# Patient Record
Sex: FEMALE | Race: BLACK OR AFRICAN AMERICAN | NOT HISPANIC OR LATINO | Employment: FULL TIME | URBAN - METROPOLITAN AREA
[De-identification: names, ages, dates, MRNs, and addresses within clinical notes are randomized per-mention and may not be internally consistent; named-entity substitution may affect disease eponyms.]

---

## 2017-11-07 ENCOUNTER — OFFICE VISIT (OUTPATIENT)
Dept: FAMILY MEDICINE | Facility: CLINIC | Age: 32
End: 2017-11-07
Payer: COMMERCIAL

## 2017-11-07 VITALS
TEMPERATURE: 99 F | HEIGHT: 65 IN | WEIGHT: 152.19 LBS | OXYGEN SATURATION: 100 % | HEART RATE: 67 BPM | DIASTOLIC BLOOD PRESSURE: 68 MMHG | BODY MASS INDEX: 25.36 KG/M2 | SYSTOLIC BLOOD PRESSURE: 102 MMHG

## 2017-11-07 DIAGNOSIS — E86.0 DEHYDRATION: ICD-10-CM

## 2017-11-07 DIAGNOSIS — Z00.00 ROUTINE HEALTH MAINTENANCE: Primary | ICD-10-CM

## 2017-11-07 PROCEDURE — 99385 PREV VISIT NEW AGE 18-39: CPT | Mod: S$GLB,,, | Performed by: FAMILY MEDICINE

## 2017-11-07 NOTE — PROGRESS NOTES
Patient ID: Mary Carmen Ann is a 31 y.o. female.    Chief Complaint: Establish Care; Polydipsia; Flank Pain; and Hip Injury    HPI      Mary Carmen Ann is a 31 y.o. female.  Here Establishing physician.  She has experienced episodes of increase thirst, acute lethargy some sweating.  Not really associated with meals or other activity.  No chest pain palpitation.  No dyspnea on exertion or PND.  Works with [employer] on [employer]  States that she feels dehydrated-increase his water intake and it helps a little.      Review of Symptoms    Constitutional: Negative.    HENT: Negative.    Eyes: Negative.    Respiratory: Negative.    Cardiovascular: Negative.    Gastrointestinal: Negative.    Endocrine: Negative.    Genitourinary: Negative.    Musculoskeletal: Negative.    Skin: Negative.    Allergic/Immunologic: Negative.    Neurological: Negative.    Hematological: Negative.    Psychiatric/Behavioral: Negative.      Except as above in HPI        Physical  Exam    Constitutional:  Oriented to person, place, and time. Appears well-developed and well-nourished.     HENT:   Head: Normocephalic and atraumatic.     Right Ear: Tympanic membrane, external ear and ear canal normal.     Left Ear: Tympanic membrane, external ear and ear canal normal.     Nose: Nose normal. No rhinorrhea or nasal deformity.     Mouth/Throat: Uvula is midline, oropharynx is clear and moist and mucous membranes are normal.      Eyes: Conjunctivae are normal. Right eye exhibits no discharge. Left eye exhibits no discharge. No scleral icterus.     Neck:  No JVD present. No tracheal deviation  [x]  Neck supple.   []  No Carotid bruit    Cardiovascular: Normal rate, regular rhythm and normal heart sounds.      Pulmonary/Chest: Effort normal and breath sounds normal. No stridor. No respiratory distress. No wheezes. No rales.      Musculoskeletal: Normal range of motion. No edema or tenderness.   No deformity     Lymphadenopathy:  No cervical adenopathy.      Neurological:  Alert and oriented to person, place, and time. Coordination normal.     Skin: Skin is warm and dry. No rash noted.     Psychiatric: Normal mood and affect. Speech is normal and behavior is normal. Judgment and thought content normal.     Complete Blood Count  Lab Results   Component Value Date    RBC 4.21 11/09/2017    HGB 13.3 11/09/2017    HCT 40.9 11/09/2017    MCV 97.1 11/09/2017    MCH 31.6 11/09/2017    MCHC 32.5 11/09/2017    RDW 12.4 11/09/2017     11/09/2017    MPV 11.9 11/09/2017    GRAN 4.0 10/10/2011    GRAN 62.8 10/10/2011    LYMPH 2,465 11/09/2017    LYMPH 31.6 11/09/2017    MONO 554 11/09/2017    MONO 7.1 11/09/2017    EOS 31 11/09/2017    BASO 23 11/09/2017    EOSINOPHIL 0.4 11/09/2017    BASOPHIL 0.3 11/09/2017       Comprehensive Metabolic Panel  Lab Results   Component Value Date     (H) 11/09/2017    BUN 12 11/09/2017    CREATININE 0.97 11/09/2017     11/09/2017    K 4.2 11/09/2017     11/09/2017    PROT 7.3 11/09/2017    ALBUMIN 4.6 11/09/2017    BILITOT 0.4 11/09/2017    AST 12 11/09/2017    ALKPHOS 67 11/09/2017    CO2 27 11/09/2017    ALT 8 11/09/2017    EGFRNONAA 79 11/09/2017    ESTGFRAFRICA 91 11/09/2017       TSH  Lab Results   Component Value Date    TSH 1.88 11/09/2017       Assessment / Plan:      ICD-10-CM ICD-9-CM   1. Routine health maintenance Z00.00 V70.0   2. Dehydration E86.0 276.51     Routine health maintenance  -     Comprehensive metabolic panel; Future  -     CBC auto differential; Future  -     Lipid panel; Future  -     TSH; Future  -     Urinalysis; Future; Expected date: 11/07/2017    Dehydration  -     Comprehensive metabolic panel; Future  -     CBC auto differential; Future  -     Lipid panel; Future  -     TSH; Future  -     Urinalysis; Future; Expected date: 11/07/2017       unsure of etiology-suggested doing lab work and letting this antolin treatment neededDiscussed how to stay healthy including: diet, exercise, refraining  from smoking and discussed screening exams / tests needed for age, sex and family Hx.

## 2017-11-10 LAB
ALBUMIN SERPL-MCNC: 4.6 G/DL (ref 3.6–5.1)
ALBUMIN/GLOB SERPL: 1.7 (CALC) (ref 1–2.5)
ALP SERPL-CCNC: 67 U/L (ref 33–115)
ALT SERPL-CCNC: 8 U/L (ref 6–29)
APPEARANCE UR: CLEAR
AST SERPL-CCNC: 12 U/L (ref 10–30)
BASOPHILS # BLD AUTO: 23 CELLS/UL (ref 0–200)
BASOPHILS NFR BLD AUTO: 0.3 %
BILIRUB SERPL-MCNC: 0.4 MG/DL (ref 0.2–1.2)
BILIRUB UR QL STRIP: NEGATIVE
BUN SERPL-MCNC: 12 MG/DL (ref 7–25)
BUN/CREAT SERPL: ABNORMAL (CALC) (ref 6–22)
CALCIUM SERPL-MCNC: 9.6 MG/DL (ref 8.6–10.2)
CHLORIDE SERPL-SCNC: 107 MMOL/L (ref 98–110)
CHOLEST SERPL-MCNC: 160 MG/DL
CHOLEST/HDLC SERPL: 2.6 (CALC)
CO2 SERPL-SCNC: 27 MMOL/L (ref 20–31)
COLOR UR: YELLOW
CREAT SERPL-MCNC: 0.97 MG/DL (ref 0.5–1.1)
EOSINOPHIL # BLD AUTO: 31 CELLS/UL (ref 15–500)
EOSINOPHIL NFR BLD AUTO: 0.4 %
ERYTHROCYTE [DISTWIDTH] IN BLOOD BY AUTOMATED COUNT: 12.4 % (ref 11–15)
GFR SERPL CREATININE-BSD FRML MDRD: 79 ML/MIN/1.73M2
GLOBULIN SER CALC-MCNC: 2.7 G/DL (CALC) (ref 1.9–3.7)
GLUCOSE SERPL-MCNC: 100 MG/DL (ref 65–99)
GLUCOSE UR QL STRIP: NEGATIVE
HCT VFR BLD AUTO: 40.9 % (ref 35–45)
HDLC SERPL-MCNC: 61 MG/DL
HGB BLD-MCNC: 13.3 G/DL (ref 11.7–15.5)
HGB UR QL STRIP: NEGATIVE
KETONES UR QL STRIP: NEGATIVE
LDLC SERPL CALC-MCNC: 87 MG/DL (CALC)
LEUKOCYTE ESTERASE UR QL STRIP: NEGATIVE
LYMPHOCYTES # BLD AUTO: 2465 CELLS/UL (ref 850–3900)
LYMPHOCYTES NFR BLD AUTO: 31.6 %
MCH RBC QN AUTO: 31.6 PG (ref 27–33)
MCHC RBC AUTO-ENTMCNC: 32.5 G/DL (ref 32–36)
MCV RBC AUTO: 97.1 FL (ref 80–100)
MONOCYTES # BLD AUTO: 554 CELLS/UL (ref 200–950)
MONOCYTES NFR BLD AUTO: 7.1 %
NEUTROPHILS # BLD AUTO: 4727 CELLS/UL (ref 1500–7800)
NEUTROPHILS NFR BLD AUTO: 60.6 %
NITRITE UR QL STRIP: NEGATIVE
NONHDLC SERPL-MCNC: 99 MG/DL (CALC)
PH UR STRIP: 7 [PH] (ref 5–8)
PLATELET # BLD AUTO: 265 THOUSAND/UL (ref 140–400)
PMV BLD REES-ECKER: 11.9 FL (ref 7.5–12.5)
POTASSIUM SERPL-SCNC: 4.2 MMOL/L (ref 3.5–5.3)
PROT SERPL-MCNC: 7.3 G/DL (ref 6.1–8.1)
PROT UR QL STRIP: NEGATIVE
RBC # BLD AUTO: 4.21 MILLION/UL (ref 3.8–5.1)
SODIUM SERPL-SCNC: 141 MMOL/L (ref 135–146)
SP GR UR STRIP: 1.01 (ref 1–1.03)
TRIGL SERPL-MCNC: 50 MG/DL
TSH SERPL-ACNC: 1.88 MIU/L
WBC # BLD AUTO: 7.8 THOUSAND/UL (ref 3.8–10.8)

## 2018-12-13 ENCOUNTER — TELEPHONE (OUTPATIENT)
Dept: FAMILY MEDICINE | Facility: CLINIC | Age: 33
End: 2018-12-13

## 2018-12-13 ENCOUNTER — OFFICE VISIT (OUTPATIENT)
Dept: FAMILY MEDICINE | Facility: CLINIC | Age: 33
End: 2018-12-13
Payer: COMMERCIAL

## 2018-12-13 VITALS
HEART RATE: 71 BPM | TEMPERATURE: 98 F | SYSTOLIC BLOOD PRESSURE: 94 MMHG | BODY MASS INDEX: 25.22 KG/M2 | OXYGEN SATURATION: 100 % | DIASTOLIC BLOOD PRESSURE: 60 MMHG | HEIGHT: 65 IN | WEIGHT: 151.38 LBS

## 2018-12-13 DIAGNOSIS — R20.0 LEFT LEG NUMBNESS: Primary | ICD-10-CM

## 2018-12-13 PROCEDURE — 99213 OFFICE O/P EST LOW 20 MIN: CPT | Mod: S$GLB,,, | Performed by: FAMILY MEDICINE

## 2018-12-13 PROCEDURE — 3008F BODY MASS INDEX DOCD: CPT | Mod: CPTII,S$GLB,, | Performed by: FAMILY MEDICINE

## 2018-12-13 RX ORDER — NAPROXEN 375 MG/1
TABLET ORAL
COMMUNITY
Start: 2018-12-10 | End: 2019-06-20

## 2018-12-13 NOTE — TELEPHONE ENCOUNTER
I have scheduled her today at 4 with dr frost  And I have notified the pt  Dr carbajal is unable to see her today

## 2018-12-13 NOTE — TELEPHONE ENCOUNTER
----- Message from Susanna Loco sent at 12/13/2018 10:37 AM CST -----  Contact: [phone]/self  Patient is requesting to be seen today. She has numbness is left thigh over the past 12 hours. Please call to schedule. Thanks

## 2018-12-13 NOTE — PROGRESS NOTES
Chief Complaint  Chief Complaint   Patient presents with    Numbness     lt leg thigh        HPI  Mary Carmen Ann is a 33 y.o. female with multiple medical diagnoses as listed in the medical history and problem list that presents for numbness in her left thigh.  Numbness started yesterday evening.  No fevers, no injury, no redness or swelling or rash.  Patient has a history of bilateral hip fractures and was recently prescribed naprosyn for pain in her right thigh.  She occasionally has back pain that she treats at home.  No weakness in the leg and No incontinenc.  She describes the numbness as the feeling when your arm falls asleep.       PAST MEDICAL HISTORY:  History reviewed. No pertinent past medical history.    PAST SURGICAL HISTORY:  Past Surgical History:   Procedure Laterality Date    KNEE SURGERY         SOCIAL HISTORY:  Social History     Socioeconomic History    Marital status: Single     Spouse name: Not on file    Number of children: Not on file    Years of education: Not on file    Highest education level: Not on file   Social Needs    Financial resource strain: Not on file    Food insecurity - worry: Not on file    Food insecurity - inability: Not on file    Transportation needs - medical: Not on file    Transportation needs - non-medical: Not on file   Occupational History    Not on file   Tobacco Use    Smoking status: Never Smoker    Smokeless tobacco: Never Used   Substance and Sexual Activity    Alcohol use: Yes     Comment: occassionally     Drug use: No    Sexual activity: Not on file   Other Topics Concern    Not on file   Social History Narrative    Not on file       FAMILY HISTORY:  Family History   Problem Relation Age of Onset    Diabetes Mother     Hypertension Mother     Cancer Father        ALLERGIES AND MEDICATIONS: updated and reviewed.  Review of patient's allergies indicates:  No Known Allergies  Current Outpatient Medications   Medication Sig Dispense Refill  "   naproxen (NAPROSYN) 375 MG tablet        No current facility-administered medications for this visit.          ROS  Review of Systems   Constitutional: Negative for activity change, appetite change, chills and fatigue.   HENT: Negative for congestion, ear discharge, ear pain, rhinorrhea, sinus pain, sore throat and trouble swallowing.    Eyes: Negative for photophobia, pain, redness, itching and visual disturbance.   Respiratory: Negative for cough, chest tightness, shortness of breath and wheezing.    Cardiovascular: Negative for chest pain, palpitations and leg swelling.   Gastrointestinal: Negative for abdominal distention, abdominal pain, blood in stool, diarrhea, nausea and vomiting.   Genitourinary: Negative for dysuria, pelvic pain, vaginal bleeding, vaginal discharge and vaginal pain.   Musculoskeletal: Negative for arthralgias, back pain, gait problem and neck pain.   Skin: Negative for color change, pallor and rash.   Neurological: Positive for numbness. Negative for dizziness, tremors, weakness, light-headedness and headaches.   Psychiatric/Behavioral: Negative for agitation, behavioral problems, confusion and sleep disturbance.           PHYSICAL EXAM  Vitals:    12/13/18 1602   BP: 94/60   BP Location: Right arm   Patient Position: Sitting   Pulse: 71   Temp: 98.4 °F (36.9 °C)   TempSrc: Oral   SpO2: 100%   Weight: 68.6 kg (151 lb 5.5 oz)   Height: 5' 5" (1.651 m)    Body mass index is 25.19 kg/m².  Weight: 68.6 kg (151 lb 5.5 oz)   Height: 5' 5" (165.1 cm)     Physical Exam   Constitutional: She appears well-developed and well-nourished.   HENT:   Head: Normocephalic.   Eyes: Conjunctivae are normal.   Neck: Normal range of motion. Neck supple.   Cardiovascular: Normal rate, regular rhythm and normal heart sounds.   Pulmonary/Chest: Effort normal and breath sounds normal.   Musculoskeletal:        Left hip: She exhibits tenderness and deformity. She exhibits normal range of motion, normal strength, " no bony tenderness, no swelling and no crepitus.        Lumbar back: She exhibits normal range of motion, no tenderness, no bony tenderness, no swelling, no deformity, no pain and no spasm.   Maybe slight discomfort ov er the left trochanteric bursa   Neurological: She is alert. She has normal strength. No sensory deficit.   Sensation to sharp and soft is intact over the left left thigh although patient describes a change in the sharp sensation.    Skin: Skin is warm and dry.   Psychiatric: Her behavior is normal.         Health Maintenance       Date Due Completion Date    TETANUS VACCINE 11/19/2005 ---    Pap Smear with HPV Cotest 11/19/2008 ---    Influenza Vaccine 08/01/2018 2/2/2010            Assessment & Plan      Mary Carmen was seen today for numbness.    Diagnoses and all orders for this visit:    Left leg numbness       -I don't see any obvious cause for the numbness sensation she is having.  I recommended some stretches for the piriformin and gluteus muscles and that she get started on the naprosyn to reduce any inflammation in the area.     Follow-up: No Follow-up on file.

## 2019-06-20 ENCOUNTER — OFFICE VISIT (OUTPATIENT)
Dept: OBSTETRICS AND GYNECOLOGY | Facility: CLINIC | Age: 34
End: 2019-06-20
Payer: COMMERCIAL

## 2019-06-20 VITALS
DIASTOLIC BLOOD PRESSURE: 66 MMHG | HEIGHT: 65 IN | WEIGHT: 152.75 LBS | SYSTOLIC BLOOD PRESSURE: 114 MMHG | BODY MASS INDEX: 25.45 KG/M2

## 2019-06-20 DIAGNOSIS — Z01.419 ENCOUNTER FOR ANNUAL ROUTINE GYNECOLOGICAL EXAMINATION: Primary | ICD-10-CM

## 2019-06-20 DIAGNOSIS — Z11.3 SCREENING EXAMINATION FOR STD (SEXUALLY TRANSMITTED DISEASE): ICD-10-CM

## 2019-06-20 DIAGNOSIS — Z12.4 PAP SMEAR FOR CERVICAL CANCER SCREENING: ICD-10-CM

## 2019-06-20 PROCEDURE — 87624 HPV HI-RISK TYP POOLED RSLT: CPT

## 2019-06-20 PROCEDURE — 87491 CHLMYD TRACH DNA AMP PROBE: CPT

## 2019-06-20 PROCEDURE — 88175 CYTOPATH C/V AUTO FLUID REDO: CPT

## 2019-06-20 PROCEDURE — 87510 GARDNER VAG DNA DIR PROBE: CPT

## 2019-06-20 PROCEDURE — 99999 PR PBB SHADOW E&M-EST. PATIENT-LVL III: CPT | Mod: PBBFAC,,, | Performed by: OBSTETRICS & GYNECOLOGY

## 2019-06-20 PROCEDURE — 99999 PR PBB SHADOW E&M-EST. PATIENT-LVL III: ICD-10-PCS | Mod: PBBFAC,,, | Performed by: OBSTETRICS & GYNECOLOGY

## 2019-06-20 PROCEDURE — 99385 PR PREVENTIVE VISIT,NEW,18-39: ICD-10-PCS | Mod: S$GLB,,, | Performed by: OBSTETRICS & GYNECOLOGY

## 2019-06-20 PROCEDURE — 99385 PREV VISIT NEW AGE 18-39: CPT | Mod: S$GLB,,, | Performed by: OBSTETRICS & GYNECOLOGY

## 2019-06-20 PROCEDURE — 87480 CANDIDA DNA DIR PROBE: CPT

## 2019-06-20 NOTE — PROGRESS NOTES
Chief Complaint   Patient presents with    Annual Exam       HISTORY OF PRESENT ILLNESS:   Mary Carmen Ann is a 33 y.o. female  who presents for well woman exam.  Patient's last menstrual period was 2019..  She has no complaints.  Cycles are regular. Desires STD testing but declines blood work. Declines birth control.   History reviewed. No pertinent past medical history.       Past Surgical History:   Procedure Laterality Date    KNEE SURGERY           Social History     Socioeconomic History    Marital status: Single     Spouse name: Not on file    Number of children: Not on file    Years of education: Not on file    Highest education level: Not on file   Occupational History    Not on file   Social Needs    Financial resource strain: Not on file    Food insecurity:     Worry: Not on file     Inability: Not on file    Transportation needs:     Medical: Not on file     Non-medical: Not on file   Tobacco Use    Smoking status: Never Smoker    Smokeless tobacco: Never Used   Substance and Sexual Activity    Alcohol use: Yes     Comment: occassionally     Drug use: No    Sexual activity: Yes     Partners: Female     Birth control/protection: None   Lifestyle    Physical activity:     Days per week: Not on file     Minutes per session: Not on file    Stress: Not on file   Relationships    Social connections:     Talks on phone: Not on file     Gets together: Not on file     Attends Latter-day service: Not on file     Active member of club or organization: Not on file     Attends meetings of clubs or organizations: Not on file     Relationship status: Not on file   Other Topics Concern    Not on file   Social History Narrative    Not on file       Family History   Problem Relation Age of Onset    Diabetes Mother     Hypertension Mother     Cancer Father          OB History    Para Term  AB Living   0 0 0 0 0 0   SAB TAB Ectopic Multiple Live Births   0 0 0 0 0  "        COMPREHENSIVE GYN HISTORY:  PAP History: Denies abnormal Paps  Infection History: Denies STDs. Denies PID.  Benign History:Denies uterine fibroids. Denies ovarian cysts. Denies endometriosis Denies other conditions.  Cancer History: Denies cervical cancer. Denies uterine cancer or hyperplasia. Denies ovarian cancer. Denies vulvar cancer or pre-cancer. Denies vaginal cancer or pre-cancer. Denies breast cancer. Denies colon cancer.  Cycle: nl age/mon/7d, very painful on first 2 days but controlled with ibuprofen     ROS:  GENERAL: Denies weight gain or weight loss. Feeling well overall.   SKIN: Denies rash or lesions.   HEAD: Denies headache.   NODES: Denies enlarged lymph nodes.   CHEST: Denies shortness of breath.   ABDOMEN: No abdominal pain, constipation, diarrhea, nausea, vomiting or rectal bleeding.   URINARY: No frequency, dysuria, hematuria, or burning on urination.  REPRODUCTIVE: See HPI.   BREASTS: The patient denies pain, lumps, or nipple discharge.       /66   Ht 5' 5" (1.651 m)   Wt 69.3 kg (152 lb 12.5 oz)   LMP 06/02/2019   BMI 25.42 kg/m²     APPEARANCE: Well nourished, well developed, in no acute distress.  NECK: Neck symmetric without  thyromegaly.  NODES: No inguinal, cervical lymph node enlargement.  CHEST: Lungs clear to auscultation.  HEART: Regular rate and rhythm, no murmurs, rubs or gallops.  ABDOMEN: Soft. No tenderness or masses. No hernias. No hepatosplenomegaly.  BREASTS: Symmetrical, no skin changes or visible lesions. No palpable masses, nipple discharge or adenopathy bilaterally.  PELVIC:   VULVA: No lesions. Normal female genitalia.  URETHRAL MEATUS: Normal size and location, no lesions, no prolapse.  URETHRA: No masses, tenderness, prolapse or scarring.  VAGINA: Moist and well rugated, no discharge, no significant cystocele or rectocele.  CERVIX: No lesions and discharge.  UTERUS: Normal size, regular shape, mobile, non-tender, bladder base nontender.  ADNEXA: No " masses or tenderness.        1. Encounter for annual routine gynecological examination    2. Pap smear for cervical cancer screening    3. Screening examination for STD (sexually transmitted disease)        Plan:  Routine gyn s/p normal breast exam. Pap With HPV cotesting ordered . STD testing: GC/CT/trich ordered but syphilis, HBV/HCV and HIV declined. Counseled on contraception and declines      F/u in 1 yr or PRN

## 2019-06-22 LAB
BACTERIAL VAGINOSIS DNA: NEGATIVE
CANDIDA GLABRATA DNA: NEGATIVE
CANDIDA KRUSEI DNA: NEGATIVE
CANDIDA RRNA VAG QL PROBE: POSITIVE
T VAGINALIS RRNA GENITAL QL PROBE: NEGATIVE

## 2019-06-23 LAB
C TRACH DNA SPEC QL NAA+PROBE: NOT DETECTED
N GONORRHOEA DNA SPEC QL NAA+PROBE: NOT DETECTED

## 2019-06-24 ENCOUNTER — PATIENT MESSAGE (OUTPATIENT)
Dept: OBSTETRICS AND GYNECOLOGY | Facility: HOSPITAL | Age: 34
End: 2019-06-24

## 2019-06-24 RX ORDER — FLUCONAZOLE 150 MG/1
150 TABLET ORAL
Qty: 2 TABLET | Refills: 0 | Status: SHIPPED | OUTPATIENT
Start: 2019-06-24 | End: 2019-06-28

## 2019-06-27 LAB
HPV HR 12 DNA CVX QL NAA+PROBE: NEGATIVE
HPV16 AG SPEC QL: NEGATIVE
HPV18 DNA SPEC QL NAA+PROBE: NEGATIVE

## 2019-06-28 ENCOUNTER — PATIENT MESSAGE (OUTPATIENT)
Dept: OBSTETRICS AND GYNECOLOGY | Facility: CLINIC | Age: 34
End: 2019-06-28

## 2019-08-03 ENCOUNTER — OFFICE VISIT (OUTPATIENT)
Dept: FAMILY MEDICINE | Facility: CLINIC | Age: 34
End: 2019-08-03
Payer: COMMERCIAL

## 2019-08-03 VITALS
TEMPERATURE: 98 F | BODY MASS INDEX: 26.54 KG/M2 | SYSTOLIC BLOOD PRESSURE: 94 MMHG | OXYGEN SATURATION: 98 % | HEIGHT: 65 IN | WEIGHT: 159.31 LBS | HEART RATE: 68 BPM | DIASTOLIC BLOOD PRESSURE: 70 MMHG

## 2019-08-03 DIAGNOSIS — Z23 NEED FOR TETANUS BOOSTER: Primary | ICD-10-CM

## 2019-08-03 DIAGNOSIS — M79.672 LEFT FOOT PAIN: ICD-10-CM

## 2019-08-03 DIAGNOSIS — N94.6 DYSMENORRHEA: ICD-10-CM

## 2019-08-03 PROCEDURE — 90471 IMMUNIZATION ADMIN: CPT | Mod: S$GLB,,, | Performed by: FAMILY MEDICINE

## 2019-08-03 PROCEDURE — 99214 OFFICE O/P EST MOD 30 MIN: CPT | Mod: 25,S$GLB,, | Performed by: FAMILY MEDICINE

## 2019-08-03 PROCEDURE — 3008F PR BODY MASS INDEX (BMI) DOCUMENTED: ICD-10-PCS | Mod: CPTII,S$GLB,, | Performed by: FAMILY MEDICINE

## 2019-08-03 PROCEDURE — 90715 TDAP VACCINE 7 YRS/> IM: CPT | Mod: S$GLB,,, | Performed by: FAMILY MEDICINE

## 2019-08-03 PROCEDURE — 99214 PR OFFICE/OUTPT VISIT, EST, LEVL IV, 30-39 MIN: ICD-10-PCS | Mod: 25,S$GLB,, | Performed by: FAMILY MEDICINE

## 2019-08-03 PROCEDURE — 90471 TDAP VACCINE GREATER THAN OR EQUAL TO 7YO IM: ICD-10-PCS | Mod: S$GLB,,, | Performed by: FAMILY MEDICINE

## 2019-08-03 PROCEDURE — 90715 TDAP VACCINE GREATER THAN OR EQUAL TO 7YO IM: ICD-10-PCS | Mod: S$GLB,,, | Performed by: FAMILY MEDICINE

## 2019-08-03 PROCEDURE — 3008F BODY MASS INDEX DOCD: CPT | Mod: CPTII,S$GLB,, | Performed by: FAMILY MEDICINE

## 2019-08-03 RX ORDER — NAPROXEN 500 MG/1
500 TABLET ORAL 2 TIMES DAILY
Qty: 20 TABLET | Refills: 0 | Status: SHIPPED | OUTPATIENT
Start: 2019-08-03

## 2019-08-03 NOTE — PROGRESS NOTES
Subjective:       Patient ID: Mary Carmen Ann is a 33 y.o. female.    Chief Complaint:   Chief Complaint   Patient presents with    Foot Pain       Foot Injury    Incident onset: about 4 months. There was no injury mechanism. Pain location: left 5th metatarsal  The pain is mild (Feels like it needs to pop, but when she does pop it it becomes painful.  Doing aerobic exercise daily. ). The pain has been fluctuating since onset. Pertinent negatives include no inability to bear weight, loss of motion, loss of sensation, muscle weakness, numbness or tingling. She reports no foreign bodies present. The symptoms are aggravated by weight bearing. She has tried nothing for the symptoms.   Female  Problem   Primary symptoms comment: pain with periods. This is a chronic problem. The current episode started more than 1 year ago. The problem occurs intermittently. The problem has been unchanged. She is not pregnant. Associated symptoms include abdominal pain. Pertinent negatives include no anorexia, back pain, chills, constipation, diarrhea, discolored urine, dysuria, fever, flank pain, frequency, headaches, hematuria, joint pain, joint swelling, nausea, painful intercourse, rash, sore throat, urgency or vomiting. She has tried NSAIDs for the symptoms. The treatment provided mild relief. She is not sexually active. She uses nothing for contraception. Her menstrual history has been regular.     Review of Systems   Constitutional: Negative for activity change, chills, fever and unexpected weight change.   HENT: Negative for hearing loss, rhinorrhea, sore throat and trouble swallowing.    Eyes: Negative for discharge and visual disturbance.   Respiratory: Negative for chest tightness and wheezing.    Cardiovascular: Negative for chest pain and palpitations.   Gastrointestinal: Positive for abdominal pain. Negative for anorexia, blood in stool, constipation, diarrhea, nausea and vomiting.   Endocrine: Negative for polydipsia and  "polyuria.   Genitourinary: Positive for menstrual problem. Negative for difficulty urinating, dysuria, flank pain, frequency, hematuria and urgency.   Musculoskeletal: Negative for back pain, joint pain, joint swelling and neck pain.   Skin: Negative for rash.   Neurological: Negative for tingling, weakness, numbness and headaches.   Psychiatric/Behavioral: Negative for confusion and dysphoric mood.     No past medical history on file.  Social History     Socioeconomic History    Marital status: Single     Spouse name: Not on file    Number of children: Not on file    Years of education: Not on file    Highest education level: Not on file   Occupational History    Not on file   Social Needs    Financial resource strain: Not hard at all    Food insecurity:     Worry: Never true     Inability: Never true    Transportation needs:     Medical: No     Non-medical: No   Tobacco Use    Smoking status: Never Smoker    Smokeless tobacco: Never Used   Substance and Sexual Activity    Alcohol use: Yes     Frequency: 2-4 times a month     Drinks per session: 1 or 2     Binge frequency: Never     Comment: occassionally     Drug use: No    Sexual activity: Yes     Partners: Female     Birth control/protection: None   Lifestyle    Physical activity:     Days per week: 4 days     Minutes per session: 50 min    Stress: Only a little   Relationships    Social connections:     Talks on phone: More than three times a week     Gets together: Once a week     Attends Rastafarian service: Not on file     Active member of club or organization: Yes     Attends meetings of clubs or organizations: More than 4 times per year     Relationship status: Never    Other Topics Concern    Not on file   Social History Narrative    Not on file       Objective:     BP 94/70   Pulse 68   Temp 98.2 °F (36.8 °C)   Ht 5' 5" (1.651 m)   Wt 72.3 kg (159 lb 4.5 oz)   SpO2 98%   BMI 26.51 kg/m²     Physical Exam   Constitutional: She " appears well-developed and well-nourished.   HENT:   Head: Normocephalic.   Eyes: Conjunctivae are normal.   Neck: Normal range of motion. Neck supple.   Cardiovascular: Normal rate, regular rhythm and normal heart sounds.   Pulmonary/Chest: Effort normal and breath sounds normal.   Musculoskeletal:        Feet:    Tenderness to palpation along the left 5th metatarsal.  No redness or swelling.    Neurological: She is alert.   Skin: Skin is warm and dry.   Psychiatric: Her behavior is normal.       Assessment:       Need for tetanus booster  -     (In Office Administered) Tdap Vaccine    Left foot pain  -     X-Ray Foot Complete Left; Future; Expected date: 08/03/2019    Dysmenorrhea   - Could consider an OCP to control periods and decrease pain.   Other orders  -     naproxen (NAPROSYN) 500 MG tablet; Take 1 tablet (500 mg total) by mouth 2 (two) times daily.  Dispense: 20 tablet; Refill: 0

## 2019-08-05 ENCOUNTER — HOSPITAL ENCOUNTER (OUTPATIENT)
Dept: RADIOLOGY | Facility: HOSPITAL | Age: 34
Discharge: HOME | End: 2019-08-05
Attending: FAMILY MEDICINE
Payer: COMMERCIAL

## 2019-08-05 DIAGNOSIS — M79.672 LEFT FOOT PAIN: ICD-10-CM

## 2019-08-05 PROCEDURE — 73630 X-RAY EXAM OF FOOT: CPT | Mod: TC,FY,PO,LT

## 2019-08-06 ENCOUNTER — PATIENT MESSAGE (OUTPATIENT)
Dept: FAMILY MEDICINE | Facility: CLINIC | Age: 34
End: 2019-08-06

## 2019-09-09 ENCOUNTER — PATIENT MESSAGE (OUTPATIENT)
Dept: FAMILY MEDICINE | Facility: CLINIC | Age: 34
End: 2019-09-09

## 2019-09-13 ENCOUNTER — TELEPHONE (OUTPATIENT)
Dept: FAMILY MEDICINE | Facility: CLINIC | Age: 34
End: 2019-09-13

## 2019-09-13 DIAGNOSIS — M79.673 PAIN OF FOOT, UNSPECIFIED LATERALITY: Primary | ICD-10-CM

## 2019-10-04 ENCOUNTER — PATIENT OUTREACH (OUTPATIENT)
Dept: ADMINISTRATIVE | Facility: OTHER | Age: 34
End: 2019-10-04

## 2019-10-07 ENCOUNTER — LAB VISIT (OUTPATIENT)
Dept: LAB | Facility: HOSPITAL | Age: 34
End: 2019-10-07
Attending: PODIATRIST
Payer: COMMERCIAL

## 2019-10-07 ENCOUNTER — OFFICE VISIT (OUTPATIENT)
Dept: PODIATRY | Facility: CLINIC | Age: 34
End: 2019-10-07
Payer: COMMERCIAL

## 2019-10-07 VITALS
WEIGHT: 159 LBS | SYSTOLIC BLOOD PRESSURE: 109 MMHG | HEART RATE: 85 BPM | DIASTOLIC BLOOD PRESSURE: 63 MMHG | HEIGHT: 65 IN | BODY MASS INDEX: 26.49 KG/M2

## 2019-10-07 DIAGNOSIS — B35.1 ONYCHOMYCOSIS: ICD-10-CM

## 2019-10-07 DIAGNOSIS — B35.3 TINEA PEDIS OF BOTH FEET: ICD-10-CM

## 2019-10-07 DIAGNOSIS — M79.672 CHRONIC FOOT PAIN, LEFT: ICD-10-CM

## 2019-10-07 DIAGNOSIS — M62.469 GASTROCNEMIUS EQUINUS, UNSPECIFIED LATERALITY: ICD-10-CM

## 2019-10-07 DIAGNOSIS — G89.29 CHRONIC FOOT PAIN, LEFT: ICD-10-CM

## 2019-10-07 DIAGNOSIS — M77.42 METATARSALGIA, LEFT FOOT: Primary | ICD-10-CM

## 2019-10-07 LAB
ALBUMIN SERPL BCP-MCNC: 4.2 G/DL (ref 3.5–5.2)
ALP SERPL-CCNC: 57 U/L (ref 55–135)
ALT SERPL W/O P-5'-P-CCNC: 11 U/L (ref 10–44)
ANION GAP SERPL CALC-SCNC: 8 MMOL/L (ref 8–16)
AST SERPL-CCNC: 14 U/L (ref 10–40)
BILIRUB SERPL-MCNC: 0.3 MG/DL (ref 0.1–1)
BUN SERPL-MCNC: 14 MG/DL (ref 6–20)
CALCIUM SERPL-MCNC: 9.5 MG/DL (ref 8.7–10.5)
CHLORIDE SERPL-SCNC: 107 MMOL/L (ref 95–110)
CO2 SERPL-SCNC: 25 MMOL/L (ref 23–29)
CREAT SERPL-MCNC: 0.9 MG/DL (ref 0.5–1.4)
EST. GFR  (AFRICAN AMERICAN): >60 ML/MIN/1.73 M^2
EST. GFR  (NON AFRICAN AMERICAN): >60 ML/MIN/1.73 M^2
GLUCOSE SERPL-MCNC: 77 MG/DL (ref 70–110)
POTASSIUM SERPL-SCNC: 4.5 MMOL/L (ref 3.5–5.1)
PROT SERPL-MCNC: 7.3 G/DL (ref 6–8.4)
SODIUM SERPL-SCNC: 140 MMOL/L (ref 136–145)

## 2019-10-07 PROCEDURE — 99999 PR PBB SHADOW E&M-EST. PATIENT-LVL IV: CPT | Mod: PBBFAC,,, | Performed by: PODIATRIST

## 2019-10-07 PROCEDURE — 3008F BODY MASS INDEX DOCD: CPT | Mod: CPTII,S$GLB,, | Performed by: PODIATRIST

## 2019-10-07 PROCEDURE — 3008F PR BODY MASS INDEX (BMI) DOCUMENTED: ICD-10-PCS | Mod: CPTII,S$GLB,, | Performed by: PODIATRIST

## 2019-10-07 PROCEDURE — 80053 COMPREHEN METABOLIC PANEL: CPT

## 2019-10-07 PROCEDURE — 36415 COLL VENOUS BLD VENIPUNCTURE: CPT

## 2019-10-07 PROCEDURE — 99203 PR OFFICE/OUTPT VISIT, NEW, LEVL III, 30-44 MIN: ICD-10-PCS | Mod: S$GLB,,, | Performed by: PODIATRIST

## 2019-10-07 PROCEDURE — 99999 PR PBB SHADOW E&M-EST. PATIENT-LVL IV: ICD-10-PCS | Mod: PBBFAC,,, | Performed by: PODIATRIST

## 2019-10-07 PROCEDURE — 99203 OFFICE O/P NEW LOW 30 MIN: CPT | Mod: S$GLB,,, | Performed by: PODIATRIST

## 2019-10-07 RX ORDER — TERBINAFINE HYDROCHLORIDE 250 MG/1
250 TABLET ORAL DAILY
Qty: 90 TABLET | Refills: 0 | Status: SHIPPED | OUTPATIENT
Start: 2019-10-07 | End: 2020-01-05

## 2019-10-07 NOTE — PROGRESS NOTES
"Subjective:      Patient ID: Mary Carmen Ann is a 33 y.o. female.    Chief Complaint: Foot Pain (Left)    complains of chronic left foot pain for the past 3-4 months.  Relates the feels like something needs to pop in her foot there is just a feeling overwhelming feeling of tightness when it comes on.  Once in a while ago be described as a sharp short lasting intermittent pain.  She was performing high intensity aerobic exercises but has stopped for the past couple months and pain is not improved.  She tried changing her shoes and this helped somewhat in beginning however pain is still worsening.  Denies any trauma.  Denies any slips strips or falls.  No history of back pain or joint pain.  Previous x-ray taken per her PCP was negative for any fracture dislocation.  She takes naproxen p.o. intermittently and this helps somewhat.  She wears boots at work.  Also complains of thickened discolored toenails to left foot. No previous treatment. Says that she had a bad case of athlete's foot to both feet several years ago and that her skin has remained dry and scaly to both feet.  Denies any itching.    Vitals:    10/07/19 1424   BP: 109/63   Pulse: 85   Weight: 72.1 kg (159 lb)   Height: 5' 5" (1.651 m)   PainSc:   3   PainLoc: Foot      No past medical history on file.    Past Surgical History:   Procedure Laterality Date    KNEE SURGERY         Family History   Problem Relation Age of Onset    Diabetes Mother     Hypertension Mother     Cancer Father        Social History     Socioeconomic History    Marital status: Single     Spouse name: Not on file    Number of children: Not on file    Years of education: Not on file    Highest education level: Not on file   Occupational History    Not on file   Social Needs    Financial resource strain: Not hard at all    Food insecurity:     Worry: Never true     Inability: Never true    Transportation needs:     Medical: No     Non-medical: No   Tobacco Use    Smoking " status: Never Smoker    Smokeless tobacco: Never Used   Substance and Sexual Activity    Alcohol use: Yes     Frequency: 2-4 times a month     Drinks per session: 1 or 2     Binge frequency: Never     Comment: occassionally     Drug use: No    Sexual activity: Yes     Partners: Female     Birth control/protection: None   Lifestyle    Physical activity:     Days per week: 4 days     Minutes per session: 50 min    Stress: Only a little   Relationships    Social connections:     Talks on phone: More than three times a week     Gets together: Once a week     Attends Zoroastrianism service: Not on file     Active member of club or organization: Yes     Attends meetings of clubs or organizations: More than 4 times per year     Relationship status: Never    Other Topics Concern    Not on file   Social History Narrative    Not on file       Current Outpatient Medications   Medication Sig Dispense Refill    naproxen (NAPROSYN) 500 MG tablet Take 1 tablet (500 mg total) by mouth 2 (two) times daily. 20 tablet 0    terbinafine HCl (LAMISIL) 250 mg tablet Take 1 tablet (250 mg total) by mouth once daily. 90 tablet 0     No current facility-administered medications for this visit.        Review of patient's allergies indicates:  No Known Allergies      Review of Systems   Constitution: Negative for chills, fever and malaise/fatigue.   HENT: Negative for congestion and hearing loss.    Cardiovascular: Negative for chest pain, claudication and leg swelling.   Respiratory: Negative for cough and shortness of breath.    Skin: Negative for color change, itching, poor wound healing and rash.   Musculoskeletal: Negative for back pain, joint pain, muscle cramps and muscle weakness.   Gastrointestinal: Negative for nausea and vomiting.   Neurological: Negative for numbness, paresthesias and weakness.   Psychiatric/Behavioral: Negative for altered mental status.           Objective:      Physical Exam   Constitutional: She is  oriented to person, place, and time. She appears well-developed and well-nourished. No distress.   Cardiovascular: Intact distal pulses.   Pulses:       Dorsalis pedis pulses are 2+ on the right side, and 2+ on the left side.        Posterior tibial pulses are 2+ on the right side, and 2+ on the left side.   CFT< 3 secs all toes bilateral foot, skin temp warm bilateral foot, + digital hair growth bilateral foot, no lower extremity edema bilateral.     Musculoskeletal:   Mild localized pain on palpation along the dorsal aspect of the 5th metatarsal shaft.  Mild localized pain on palpation dorsal lateral left midfoot.  No pain with left forefoot and midfoot range of motion.    Subtalar joint and midtarsal joint range of motion within normal limits left foot.    Rectus arch appearance bilateral foot.    Ankle range of motion exam reveals -5 degrees of dorsiflexion with knee extended bilateral that reduces to greater than 10° of dorsiflexion during knee flexion.    Mild pain on palpation overlying the anterior lateral mid left leg overlying the superficial peroneal nerve with no Tinel sign.  Pain does not radiate and it is localized.  Slight pain on palpation overlying the intermediate dorsal cutaneous nerve branch left foot.   Neurological: She is alert and oriented to person, place, and time. She has normal strength. No sensory deficit. Gait normal.   Skin: Skin is warm, dry and intact. Capillary refill takes less than 2 seconds. No ecchymosis and no rash noted. She is not diaphoretic. No cyanosis or erythema. No pallor. Nails show no clubbing.   Refer to attached media picture for toenail appearance.                  Assessment:       Encounter Diagnoses   Name Primary?    Metatarsalgia, left foot Yes    Chronic foot pain, left     Onychomycosis     Tinea pedis of both feet     Gastrocnemius equinus, unspecified laterality          Plan:       Mary Carmen was seen today for foot pain.    Diagnoses and all orders for  this visit:    Metatarsalgia, left foot  -     MRI Foot (Forefoot) Left Without Contrast; Future  -     Ambulatory Referral to Physical/Occupational Therapy    Chronic foot pain, left  -     MRI Foot (Forefoot) Left Without Contrast; Future  -     Ambulatory Referral to Physical/Occupational Therapy    Onychomycosis  -     Comprehensive metabolic panel; Future  -     Hepatic function panel; Future    Tinea pedis of both feet    Gastrocnemius equinus, unspecified laterality    Other orders  -     terbinafine HCl (LAMISIL) 250 mg tablet; Take 1 tablet (250 mg total) by mouth once daily.      I counseled the patient on her conditions, their implications and medical management.    Reviewed previous left foot x-ray noting no fracture dislocation or underlying bony destructive lesions.    MRI ordered to assess for possible stress fracture of the left 5th metatarsal and any other pathology that may be contributing to her pain.    Discussed continued activity and shoe gear modification.    Referred to physical therapy for modalities to help reduce inflammation along the dorsal lateral left forefoot to include iontophoresis and active range of motion exercises to improve her ankle range of motion.    Discussed treatment options for fungal toenails to include topical vs oral vs laser vs combined therapy in detail.    We discussed using Lamisil for onychomycosis. This drug offers a fairly high but not universal cure rate. A 12 week treatment course is recommended. The patient is aware that rare cases of liver injury have been reported; and agrees to come in for liver function tests at 6 weeks of treatment. The symptoms of liver disease have been discussed; call if such occurs. In addition, some insurance plans do not cover the expense of this drug for treating a cosmetic condition, and the patient understands they may have to pay for the medication. Other side effects, such as headaches and rashes, have also been  discussed.    RTC 6 weeks or p.r.n..    .

## 2019-10-07 NOTE — LETTER
October 8, 2019      Renu Leslie, DO  [address] [address]           [facility]  [address], [address]  [facility] [address]  Phone: [phone]  Fax: [phone]          Patient: Mary Carmen Ann   MR Number: [id number]   YOB: 1985   Date of Visit: 10/7/2019       Dear Dr. Renu Leslie:    Thank you for referring Mary Carmen Ann to me for evaluation. Attached you will find relevant portions of my assessment and plan of care.    If you have questions, please do not hesitate to call me. I look forward to following Mary Carmen Ann along with you.    Sincerely,    Carlos Ji, DPESPINOZA    Enclosure  CC:  No Recipients    If you would like to receive this communication electronically, please contact [email or web address] or [phone] to request more information on [employer] access.    For providers and/or their staff who would like to refer a patient to [facility], please contact us through our one-stop-shop provider referral line, [facility], at [phone].    If you feel you have received this communication in error or would no longer like to receive these types of communications, please e-mail [email or web address]

## 2019-10-08 ENCOUNTER — PATIENT MESSAGE (OUTPATIENT)
Dept: PODIATRY | Facility: CLINIC | Age: 34
End: 2019-10-08

## 2019-10-08 PROBLEM — M77.42 METATARSALGIA, LEFT FOOT: Status: ACTIVE | Noted: 2019-10-08

## 2019-10-08 PROBLEM — B35.1 ONYCHOMYCOSIS: Status: ACTIVE | Noted: 2019-10-08

## 2019-10-14 ENCOUNTER — HOSPITAL ENCOUNTER (OUTPATIENT)
Dept: RADIOLOGY | Facility: HOSPITAL | Age: 34
Discharge: HOME | End: 2019-10-14
Attending: PODIATRIST
Payer: COMMERCIAL

## 2019-10-14 DIAGNOSIS — G89.29 CHRONIC FOOT PAIN, LEFT: ICD-10-CM

## 2019-10-14 DIAGNOSIS — M79.672 CHRONIC FOOT PAIN, LEFT: ICD-10-CM

## 2019-10-14 DIAGNOSIS — M77.42 METATARSALGIA, LEFT FOOT: ICD-10-CM

## 2019-10-14 PROCEDURE — 73718 MRI LOWER EXTREMITY W/O DYE: CPT | Mod: TC,PO,LT

## 2019-10-15 ENCOUNTER — PATIENT MESSAGE (OUTPATIENT)
Dept: PODIATRY | Facility: CLINIC | Age: 34
End: 2019-10-15

## 2019-10-19 ENCOUNTER — PATIENT MESSAGE (OUTPATIENT)
Dept: PODIATRY | Facility: CLINIC | Age: 34
End: 2019-10-19

## 2019-10-19 RX ORDER — METHYLPREDNISOLONE 4 MG/1
TABLET ORAL
Qty: 1 PACKAGE | Refills: 0 | Status: SHIPPED | OUTPATIENT
Start: 2019-10-19 | End: 2019-11-09

## 2019-11-04 ENCOUNTER — CLINICAL SUPPORT (OUTPATIENT)
Dept: REHABILITATION | Facility: HOSPITAL | Age: 34
End: 2019-11-04
Attending: PODIATRIST
Payer: COMMERCIAL

## 2019-11-04 DIAGNOSIS — M79.672 PAIN IN LEFT FOOT: ICD-10-CM

## 2019-11-04 PROCEDURE — 97110 THERAPEUTIC EXERCISES: CPT | Mod: PO

## 2019-11-04 PROCEDURE — 97161 PT EVAL LOW COMPLEX 20 MIN: CPT | Mod: PO

## 2019-11-04 NOTE — PLAN OF CARE
[facility]  Physical Therapy Initial Evaluation    Name: Mary Carmen Ann  Clinic Number: [id number]    Therapy Diagnosis:   Encounter Diagnosis   Name Primary?    Pain in left foot      Physician: Carlos Ji DPM    Physician Orders: PT Eval and Treat   Medical Diagnosis from Referral: M77.42 (ICD-10-CM) - Metatarsalgia, left foot M79.672,G89.29 (ICD-10-CM) - Chronic foot pain, left   Evaluation Date: 11/4/2019  Authorization Period Expiration: 12/31/2019   Plan of Care Expiration: 1/4/20  Visit # / Visits authorized: 1/ 15    Time In: 10:00am  Time Out: 11:00am  Total Billable Time: 60 minutes    Precautions: Standard    Subjective   Date of onset: May 2019   History of current condition - Mary Carmen reports: insidious onset of symptoms that began in May.  She reports symptoms began as a feeling that a joint needs to pop.  She reports pain is now primarily when she runs and her pain begins to increase after 2-3 minutes of running.  Her normal pain is intermittent at this time.     Medical History:   No past medical history on file.    Surgical History:   Mary Carmen Ann  has a past surgical history that includes Knee surgery.    Medications:   Mary Carmen has a current medication list which includes the following prescription(s): methylprednisolone, naproxen, and terbinafine hcl.    Allergies:   Review of patient's allergies indicates:  No Known Allergies     Imaging, MRI studies, x-rays:   FINDINGS:  Normal left foot x-ray.     FINDINGS:  There is failure of fat saturation involving the toes and a few metatarsal heads.  The bone marrow signal intensity is otherwise normal.  Negative for stress fracture.  Normal alignment.  No arthrosis.  No soft tissue swelling.  The musculature and tendons are normal.  No ganglion cyst or mass.     Prior Therapy: yes for knee while in [employer]  Social History: single story home lives alone  Occupation: law enforcement  Prior Level of Function:  independent  Current Level of Function: independent    Pain:  Current 0/10, worst 6/10, best 0/10   Location: left feet   Description: Throbbing, Tight and Sharp  Aggravating Factors: running  Easing Factors: rest    Pts goals: return to running    Objective       Posture: normal      Gait: normal gait pattern    Range of Motion: AROM (PROM):      Ankle Left Right   Dorsiflexion 15 (20) degrees 18 (20) degrees   Plantarflexion 55 (60) degrees 55 (60) degrees   Inversion 40 (NT) degrees 40 (NT) degrees   Eversion 15 (20) degrees 20 (NT) degrees       Strength:    Ankle Left Right   Dorsiflexion 4+/5 5/5   Plantarflexion 5/5 5/5   Inversion 4+/5 5/5   Eversion 4+/5 5/5         Joint Mobility: normal joint mobility in left ankle and feet    Palpation: slight tenderness to palpation noted over 4rth metatarsal, interossei    Sensation: intact to light touch      CMS Impairment/Limitation/Restriction for FOTO Foot Survey    Therapist reviewed FOTO scores for Mary Carmen Ann on 11/4/2019.   FOTO documents entered into [employer] - see Media section.    Limitation Score: 38%  Category: Mobility    Current : CJ = at least 20% but < 40% impaired, limited or restricted  Goal: CJ = at least 20% but < 40% impaired, limited or restricted  Discharge: not at this time         TREATMENT   Treatment Time In: 10:50am  Treatment Time Out: 11:00am  Total Treatment time separate from Evaluation: 10 minutes    Mary Carmen received therapeutic exercises to develop strength, endurance, ROM and flexibility for 10 minutes including:  - hamstring stretching, gastroc stretching, 4 way ankle with blue band, single leg stance    Home Exercises and Patient Education Provided    Education provided:   - HEP  - instructed to use ice PRN for 10 minutes at a time to help decrease pain and inflammation    Written Home Exercises Provided: yes.  Exercises were reviewed and Mary Carmen was able to demonstrate them prior to the end of the session.  Mary Carmen demonstrated good   understanding of the education provided.     See EMR under Patient Instructions for exercises provided 11/4/2019.    Assessment   Mary Carmen is a 33 y.o. female referred to outpatient Physical Therapy with a medical diagnosis of pain in left foot. Pt presents with signs and symptoms consistent with the diagnosis.  She is reporting decreased frequency and intensity of her pain recently but continues to have pain when she attempts to run or perform her plyometric exercises.  Patient has slight decrease in strength of left ankle/foot as compared to right and would benefit from therapy to improve strength, stability and proprioception of left ankle/foot and return to prior level of function.    Pt prognosis is Good.   Pt will benefit from skilled outpatient Physical Therapy to address the deficits stated above and in the chart below, provide pt/family education, and to maximize pt's level of independence.     Plan of care discussed with patient: Yes  Pt's spiritual, cultural and educational needs considered and patient is agreeable to the plan of care and goals as stated below:     Anticipated Barriers for therapy: none    Medical Necessity is demonstrated by the following  History  Co-morbidities and personal factors that may impact the plan of care Co-morbidities:   none    Personal Factors:   no deficits     low   Examination  Body Structures and Functions, activity limitations and participation restrictions that may impact the plan of care Body Regions:   lower extremities    Body Systems:    strength    Participation Restrictions:   Work schedule    Activity limitations:   Learning and applying knowledge  no deficits    General Tasks and Commands  no deficits    Communication  no deficits    Mobility  lifting and carrying objects  walking  running    Self care  no deficits    Domestic Life  exercise routine    Interactions/Relationships  no deficits    Life Areas  no deficits    Community and Social Life  no deficits          low   Clinical Presentation stable and uncomplicated low   Decision Making/ Complexity Score: low     Goals:  Short Term Goals: (4 weeks)  1. Patient will be compliant with HEP to promote the independent management of current diagnosis.  2. Patient will increase left ankle dorsiflexion strength to 5/5.  3. Patient will report a decrease in complaints of left foot pain to 3/10 while jogging.    Long Term Goals: (8 weeks)  1. Patient will increase strength of left ankle to 5/5 to improve stability while running and performing normal exercise routine.  2. Patient will report a decrease in complaints of left foot pain to 1/10 while jogging.  1. Patient will improve FOTO limitation status from 38% to 24% placing the patient in the 20-40%% impaired, limited, or restricted category indicating increased functional mobility.      Plan   Plan of care Certification: 11/4/2019 to 01/04/2020.    Outpatient Physical Therapy 2 times weekly for 8 weeks to include the following interventions: Electrical Stimulation IFC , Manual Therapy, Moist Heat/ Ice, Neuromuscular Re-ed, Patient Education, Therapeutic Exercise, Ultrasound and IASTM, vacuum cupping, dry needling, and kinesiotaping.     Leon Caballero, PT

## 2019-11-14 ENCOUNTER — PATIENT OUTREACH (OUTPATIENT)
Dept: ADMINISTRATIVE | Facility: OTHER | Age: 34
End: 2019-11-14

## 2019-11-15 ENCOUNTER — LAB VISIT (OUTPATIENT)
Dept: LAB | Facility: HOSPITAL | Age: 34
End: 2019-11-15
Attending: PODIATRIST
Payer: COMMERCIAL

## 2019-11-15 DIAGNOSIS — B35.1 ONYCHOMYCOSIS: ICD-10-CM

## 2019-11-15 LAB
ALBUMIN SERPL BCP-MCNC: 4.3 G/DL (ref 3.5–5.2)
ALP SERPL-CCNC: 50 U/L (ref 38–126)
ALT SERPL W/O P-5'-P-CCNC: 24 U/L (ref 10–44)
AST SERPL-CCNC: 40 U/L (ref 15–46)
BILIRUB SERPL-MCNC: 0.4 MG/DL (ref 0.1–1)
PROT SERPL-MCNC: 7.3 G/DL (ref 6–8.4)

## 2019-11-15 PROCEDURE — 36415 COLL VENOUS BLD VENIPUNCTURE: CPT | Mod: PO

## 2019-11-15 PROCEDURE — 80076 HEPATIC FUNCTION PANEL: CPT | Mod: PO

## 2019-11-18 ENCOUNTER — CLINICAL SUPPORT (OUTPATIENT)
Dept: REHABILITATION | Facility: HOSPITAL | Age: 34
End: 2019-11-18
Attending: PODIATRIST
Payer: COMMERCIAL

## 2019-11-18 DIAGNOSIS — M79.672 PAIN IN LEFT FOOT: ICD-10-CM

## 2019-11-18 PROCEDURE — 97110 THERAPEUTIC EXERCISES: CPT | Mod: PO

## 2019-11-18 NOTE — PROGRESS NOTES
"  Physical Therapy Daily Treatment Note     Name: Mary Carmen Ann  Clinic Number: [id number]    Therapy Diagnosis:   Encounter Diagnosis   Name Primary?    Pain in left foot      Physician: Carlos Ji DPM    Visit Date: 11/18/2019    Physician Orders: PT Eval and Treat   Medical Diagnosis from Referral: M77.42 (ICD-10-CM) - Metatarsalgia, left foot M79.672,G89.29 (ICD-10-CM) - Chronic foot pain, left   Evaluation Date: 11/4/2019  Authorization Period Expiration: 12/31/2019   Plan of Care Expiration: 1/4/20  Visit # / Visits authorized: 2/ 15    Time In: 1:05pm  Time Out: 2:00pm  Total Billable Time: 55 minutes    Precautions: Standard    Subjective     Pt reports: she was able to run 2 miles this morning without increase in left foot pain.  She has been compliant with HEP.  She was compliant with home exercise program.  Response to previous treatment: decreased foot pain  Functional change: improving tolerance to running    Pain: 0/10  Location: left feet      Objective     Mary Carmen received therapeutic exercises to develop strength, endurance, ROM and flexibility for 55 minutes including:    Date  11/18/19   VISIT 2/15       POC EXP. DATE 1/4/20   FACE-TO-FACE 12/4/19   FOTO 2/5       TABLE:    gastroc stretch 10 x 10"   4 way ankle  3 x 10 BTB   Severance  1 cup       STANDING:    Calf raises 3 x 10   Single leg stance 3 x 30" Airex   BAPS board (DF/PF, Pro/Sup, CW/CCW) L2 2 x 10 ea   BOSU ball:  Step ups  Squats  Lateral step over   3 x 10  2 x 10  2 x 10   Single leg stance cone  X 3   Toe walks 4 x 14 ft   Heel walks 4 x 14 ft           Initials MA         Home Exercises Provided and Patient Education Provided     Education provided:   - continue with HEP  - gradually progress jogging distance and intensity   - hold plyometric exercises at gym at this time    Written Home Exercises Provided: Patient instructed to cont prior HEP.  Exercises were reviewed and Mary Carmen was able to demonstrate them prior " to the end of the session.  Mary Carmen demonstrated good  understanding of the education provided.     See EMR under Patient Instructions for exercises provided prior visit.    Assessment     Mary Carmen has been compliant with HEP and was able to tolerate jogging 2 miles this morning without exacerbation of symptoms.  She has no increase in pain reported during or following today's exercises.  She require verbal cues to isolate ankle movement during resisted 4 way ankle exercises to decrease LE rotation.  Pt will continue with current treatment plan and progress balance and proprioceptive training as tolerated.    Mary Carmen is progressing well towards her goals.   Pt prognosis is Good.     Pt will continue to benefit from skilled outpatient physical therapy to address the deficits listed in the problem list box on initial evaluation, provide pt/family education and to maximize pt's level of independence in the home and community environment.     Pt's spiritual, cultural and educational needs considered and pt agreeable to plan of care and goals.     Anticipated barriers to physical therapy: none    Goals:   Short Term Goals: (4 weeks)  1. Patient will be compliant with HEP to promote the independent management of current diagnosis. In Progress  2. Patient will increase left ankle dorsiflexion strength to 5/5. In Progress  Patient will report a decrease in complaints of left foot pain to 3/10 while jogging. In Progress   Long Term Goals: (8 weeks)  1. Patient will increase strength of left ankle to 5/5 to improve stability while running and performing normal exercise routine. In Progress  2. Patient will report a decrease in complaints of left foot pain to 1/10 while jogging. In Progress  3.   Patient will improve FOTO limitation status from 38% to 24% placing the patient in the 20-40%% impaired, limited, or restricted category indicating increased functional mobility. In Progress    Plan     Continue with PT POC.    Leon  Federico, DAHLIA

## 2019-11-25 ENCOUNTER — CLINICAL SUPPORT (OUTPATIENT)
Dept: REHABILITATION | Facility: HOSPITAL | Age: 34
End: 2019-11-25
Attending: PODIATRIST
Payer: COMMERCIAL

## 2019-11-25 DIAGNOSIS — M79.672 PAIN IN LEFT FOOT: ICD-10-CM

## 2019-11-25 PROCEDURE — 97110 THERAPEUTIC EXERCISES: CPT | Mod: PO

## 2019-11-25 NOTE — PROGRESS NOTES
"  Physical Therapy Daily Treatment Note     Name: Mary Carmen Ann  Clinic Number: [id number]    Therapy Diagnosis:   Encounter Diagnosis   Name Primary?    Pain in left foot      Physician: Carlos Ji DPM    Visit Date: 11/25/2019    Physician Orders: PT Eval and Treat   Medical Diagnosis from Referral: M77.42 (ICD-10-CM) - Metatarsalgia, left foot M79.672,G89.29 (ICD-10-CM) - Chronic foot pain, left   Evaluation Date: 11/4/2019  Authorization Period Expiration: 12/31/2019   Plan of Care Expiration: 1/4/20  Visit # / Visits authorized: 3/ 15    Time In: 9:00am  Time Out: 10:00am  Total Billable Time: 55 minutes    Precautions: Standard    Subjective     Pt reports: she had increased soreness following last treatment that she rated at 3-4/10.  She was compliant with home exercise program.  Response to previous treatment: decreased foot pain  Functional change: improving tolerance to running    Pain: 2/10  Location: left feet      Objective     Mary Carmen received therapeutic exercises to develop strength, endurance, ROM and flexibility for 55 minutes including:    Date  11/25/19 11/18/19   VISIT 3/15 2/15        POC EXP. DATE 1/4/20 1/4/20   FACE-TO-FACE 12/4/19 12/4/19   FOTO 3/5 2/5        TABLE:     gastroc stretch 10 x 10" 10 x 10"   4 way ankle  3 x 10 BTB 3 x 10 BTB   [place]  2 cups 1 cup        STANDING:     Calf raises 3 x 10 3 x 10   Single leg stance 3 x 10 ball toss on Airex 3 x 30" Airex   BAPS board (DF/PF, Pro/Sup, CW/CCW) --- L2 2 x 10 ea   BOSU ball:  Step ups  Squats  Lateral step over   3 x 10  3 x 10  3 x 10   3 x 10  2 x 10  2 x 10   Single leg stance cone  X 4 X 3   Toe walks -- 4 x 14 ft   Heel walks -- 4 x 14 ft   Ladder drills 2 laps ea         Initials SHALINI LOYA         Home Exercises Provided and Patient Education Provided     Education provided:   - continue with HEP  - gradually progress jogging distance and intensity   - hold plyometric exercises at gym at this time    Written " Home Exercises Provided: Patient instructed to cont prior HEP.  Exercises were reviewed and Mary Carmen was able to demonstrate them prior to the end of the session.  Mary Carmen demonstrated good  understanding of the education provided.     See EMR under Patient Instructions for exercises provided prior visit.    Assessment     Mary Carmen had slight increase in foot pain following last treatment.  She performs ladder drills without increase in left foot pain.  Pt will continue with progression of exercises to improve stability of foot and return to normal exercise routine.  Pt was instructed to ice foot x 10 minutes two times a day if symptoms increase following today's treatment.    Mary Carmen is progressing well towards her goals.   Pt prognosis is Good.     Pt will continue to benefit from skilled outpatient physical therapy to address the deficits listed in the problem list box on initial evaluation, provide pt/family education and to maximize pt's level of independence in the home and community environment.     Pt's spiritual, cultural and educational needs considered and pt agreeable to plan of care and goals.     Anticipated barriers to physical therapy: none    Goals:   Short Term Goals: (4 weeks)  1. Patient will be compliant with HEP to promote the independent management of current diagnosis. In Progress  2. Patient will increase left ankle dorsiflexion strength to 5/5. In Progress  Patient will report a decrease in complaints of left foot pain to 3/10 while jogging. In Progress   Long Term Goals: (8 weeks)  1. Patient will increase strength of left ankle to 5/5 to improve stability while running and performing normal exercise routine. In Progress  2. Patient will report a decrease in complaints of left foot pain to 1/10 while jogging. In Progress  3.   Patient will improve FOTO limitation status from 38% to 24% placing the patient in the 20-40%% impaired, limited, or restricted category indicating increased functional  mobility. In Progress    Plan     Continue with PT POC.    Leon Caballero, PT

## 2019-12-02 ENCOUNTER — CLINICAL SUPPORT (OUTPATIENT)
Dept: REHABILITATION | Facility: HOSPITAL | Age: 34
End: 2019-12-02
Attending: PODIATRIST
Payer: COMMERCIAL

## 2019-12-02 DIAGNOSIS — M79.672 PAIN IN LEFT FOOT: ICD-10-CM

## 2019-12-02 PROCEDURE — 97110 THERAPEUTIC EXERCISES: CPT | Mod: PO

## 2019-12-02 NOTE — PROGRESS NOTES
"  Physical Therapy Daily Treatment Note     Name: Mary Carmen Ann  Clinic Number: [id number]    Therapy Diagnosis:   Encounter Diagnosis   Name Primary?    Pain in left foot      Physician: Carlos Ji DPM    Visit Date: 12/2/2019    Physician Orders: PT Eval and Treat   Medical Diagnosis from Referral: M77.42 (ICD-10-CM) - Metatarsalgia, left foot M79.672,G89.29 (ICD-10-CM) - Chronic foot pain, left   Evaluation Date: 11/4/2019  Authorization Period Expiration: 12/31/2019   Plan of Care Expiration: 1/4/20  Visit # / Visits authorized: 4/ 15    Time In: 9:00am  Time Out: 9: 45 am  Total Billable Time: 45 minutes    Precautions: Standard    Subjective     Pt reports: she had improved tolerance to treatment last visit without exacerbation of foot pain.  She was compliant with home exercise program.  Response to previous treatment: decreased foot pain  Functional change: improving tolerance to running    Pain: 1/10  Location: left feet      Objective     Mary Carmen received therapeutic exercises to develop strength, endurance, ROM and flexibility for 45 minutes including:    Date  12/2/19 11/25/19 11/18/19   VISIT 4/15 3/15 2/15         POC EXP. DATE 1/4/20 1/4/20 1/4/20   FACE-TO-FACE 12/4/19 12/4/19 12/4/19   FOTO 3/5 3/5 2/5         TABLE:      gastroc stretch 10 x 10" 10 x 10" 10 x 10"   4 way ankle  3 x 10 BTB 3 x 10 BTB 3 x 10 BTB   [place]  2 cups 2 cups 1 cup         STANDING:      Calf raises 3 x 10 3 x 10 3 x 10   Single leg stance 3 x 10 ball toss on Airex 3 x 10 ball toss on Airex 3 x 30" Airex   BAPS board (DF/PF, Pro/Sup, CW/CCW) L2 1 x 10 ea --- L2 2 x 10 ea   BOSU ball:  Step ups  Squats  Lateral step over   3 x 10  3 x 10  3 x 10   3 x 10  3 x 10  3 x 10   3 x 10  2 x 10  2 x 10   Single leg stance cone  X 4 X 4 X 3   Toe walks -- -- 4 x 14 ft   Heel walks -- -- 4 x 14 ft   Ladder drills 2 laps ea 2 laps ea                            Initials SHALINI LOYA MA       Home Exercises Provided and " Patient Education Provided     Education provided:   - continue with HEP  - gradually progress jogging distance and intensity   - hold plyometric exercises at gym at this time    Written Home Exercises Provided: Patient instructed to cont prior HEP.  Exercises were reviewed and Mary Carmen was able to demonstrate them prior to the end of the session.  Mary Carmen demonstrated good  understanding of the education provided.     See EMR under Patient Instructions for exercises provided prior visit.    Assessment     Mary Carmen did not experience increased foot pain following last treatment and is able to progress exercises on this date.  She will continue with progression of plyometric exercises in order to improve tolerance to her normal workout routine.      Mary Carmen is progressing well towards her goals.   Pt prognosis is Good.     Pt will continue to benefit from skilled outpatient physical therapy to address the deficits listed in the problem list box on initial evaluation, provide pt/family education and to maximize pt's level of independence in the home and community environment.     Pt's spiritual, cultural and educational needs considered and pt agreeable to plan of care and goals.     Anticipated barriers to physical therapy: none    Goals:   Short Term Goals: (4 weeks)  1. Patient will be compliant with HEP to promote the independent management of current diagnosis. In Progress  2. Patient will increase left ankle dorsiflexion strength to 5/5. In Progress  Patient will report a decrease in complaints of left foot pain to 3/10 while jogging. In Progress   Long Term Goals: (8 weeks)  1. Patient will increase strength of left ankle to 5/5 to improve stability while running and performing normal exercise routine. In Progress  2. Patient will report a decrease in complaints of left foot pain to 1/10 while jogging. In Progress  3.   Patient will improve FOTO limitation status from 38% to 24% placing the patient in  the 20-40%% impaired, limited, or restricted category indicating increased functional mobility. In Progress    Plan     Continue with PT POC.    Leon Caballero, PT

## 2019-12-09 ENCOUNTER — CLINICAL SUPPORT (OUTPATIENT)
Dept: REHABILITATION | Facility: HOSPITAL | Age: 34
End: 2019-12-09
Attending: PODIATRIST
Payer: COMMERCIAL

## 2019-12-09 ENCOUNTER — DOCUMENTATION ONLY (OUTPATIENT)
Dept: REHABILITATION | Facility: HOSPITAL | Age: 34
End: 2019-12-09

## 2019-12-09 DIAGNOSIS — M79.672 PAIN IN LEFT FOOT: ICD-10-CM

## 2019-12-09 PROCEDURE — 97110 THERAPEUTIC EXERCISES: CPT | Mod: PO

## 2019-12-09 NOTE — PROGRESS NOTES
Face to Face PTA Conference performed with Luis Gallo, NICOLAS regarding patient's current status, overall progress, and plan of care    Face to Face PTA Conference performed with Leon Caballero PT regarding patient's current status, overall progress, and plan of care    Luis Gallo  12/9/2019

## 2019-12-09 NOTE — PROGRESS NOTES
"  Physical Therapy Daily Treatment Note     Name: Mary Carmen Ann  Clinic Number: [id number]    Therapy Diagnosis:   Encounter Diagnosis   Name Primary?    Pain in left foot      Physician: Carlos Ji DPM    Visit Date: 12/9/2019    Physician Orders: PT Eval and Treat   Medical Diagnosis from Referral: M77.42 (ICD-10-CM) - Metatarsalgia, left foot M79.672,G89.29 (ICD-10-CM) - Chronic foot pain, left   Evaluation Date: 11/4/2019  Authorization Period Expiration: 12/31/2019   Plan of Care Expiration: 1/4/20  Visit # / Visits authorized: 5/ 15    Time In: 9:00am  Time Out: 9: 40 am  Total Billable Time: 30 minutes    Precautions: Standard    Subjective     Pt reports: she did not have increase in foot pain following last treatment and has tolerated her normal work duties well.  She was compliant with home exercise program.  Response to previous treatment: decreased foot pain  Functional change: improving tolerance to running    Pain: 1/10  Location: left feet      Objective     Mary Carmen received therapeutic exercises to develop strength, endurance, ROM and flexibility for 45 minutes including:    Date  12/9/19 12/2/19 11/25/19 11/18/19   VISIT 5/15 4/15 3/15 2/15          POC EXP. DATE 1/4/20 1/4/20 1/4/20 1/4/20   FACE-TO-FACE 1/9/20 12/4/19 12/4/19 12/4/19   FOTO 4/5 3/5 3/5 2/5          TABLE:       gastroc stretch 10 x 10" 10 x 10" 10 x 10" 10 x 10"   4 way ankle  3 x 10 BTB 3 x 10 BTB 3 x 10 BTB 3 x 10 BTB   [place]  2 cups 2 cups 2 cups 1 cup          STANDING:       Calf raises 3 x 10 3 x 10 3 x 10 3 x 10   Single leg stance 3 x 10 ball toss on Airex 3 x 10 ball toss on Airex 3 x 10 ball toss on Airex 3 x 30" Airex   BAPS board (DF/PF, Pro/Sup, CW/CCW) L2 1 x 10 ea L2 1 x 10 ea --- L2 2 x 10 ea   BOSU ball:  Step ups  Squats  Lateral step over   3 x 10   3 x 10  3 x 10   3 x 10  3 x 10  3 x 10   3 x 10  3 x 10  3 x 10   3 x 10  2 x 10  2 x 10   Single leg stance cone  X 4 X 4 X 4 X 3   Toe walks " -- -- -- 4 x 14 ft   Heel walks -- -- -- 4 x 14 ft   Ladder drills 2 laps ea 2 laps ea 2 laps ea                                Initials SHALINI LOYA MA, MA       Home Exercises Provided and Patient Education Provided     Education provided:   - continue with HEP  - gradually progress jogging distance and intensity   - hold plyometric exercises at gym at this time    Written Home Exercises Provided: Patient instructed to cont prior HEP.  Exercises were reviewed and Mary Carmen was able to demonstrate them prior to the end of the session.  Mary Carmen demonstrated good  understanding of the education provided.     See EMR under Patient Instructions for exercises provided prior visit.    Assessment     Mary Carmen has improved tolerance to normal work duties and reporting no increased pain following her last visit.  She has improved stability during ladder drills and BOSU ball exercises.  Pt will continue with current plan of care and attempt gradual progression back to normal exercise routine at gym.      Mary Carmen is progressing well towards her goals.   Pt prognosis is Good.     Pt will continue to benefit from skilled outpatient physical therapy to address the deficits listed in the problem list box on initial evaluation, provide pt/family education and to maximize pt's level of independence in the home and community environment.     Pt's spiritual, cultural and educational needs considered and pt agreeable to plan of care and goals.     Anticipated barriers to physical therapy: none    Goals:   Short Term Goals: (4 weeks)  1. Patient will be compliant with HEP to promote the independent management of current diagnosis. In Progress  2. Patient will increase left ankle dorsiflexion strength to 5/5. In Progress  Patient will report a decrease in complaints of left foot pain to 3/10 while jogging. In Progress   Long Term Goals: (8 weeks)  1. Patient will increase strength of left ankle to 5/5 to improve stability while running and performing  normal exercise routine. In Progress  2. Patient will report a decrease in complaints of left foot pain to 1/10 while jogging. In Progress  3.   Patient will improve FOTO limitation status from 38% to 24% placing the patient in the 20-40%% impaired, limited, or restricted category indicating increased functional mobility. In Progress    Plan     Continue with PT POC.    Leon Caballero, PT

## 2019-12-30 ENCOUNTER — CLINICAL SUPPORT (OUTPATIENT)
Dept: REHABILITATION | Facility: HOSPITAL | Age: 34
End: 2019-12-30
Attending: PODIATRIST
Payer: COMMERCIAL

## 2019-12-30 DIAGNOSIS — M79.672 PAIN IN LEFT FOOT: ICD-10-CM

## 2019-12-30 PROCEDURE — 97110 THERAPEUTIC EXERCISES: CPT | Mod: PO

## 2019-12-30 NOTE — PROGRESS NOTES
"  Physical Therapy Daily Treatment Note     Name: Mary Carmen Ann  Clinic Number: [id number]    Therapy Diagnosis:   Encounter Diagnosis   Name Primary?    Pain in left foot      Physician: Carlos Ji DPM    Visit Date: 12/30/2019    Physician Orders: PT Eval and Treat   Medical Diagnosis from Referral: M77.42 (ICD-10-CM) - Metatarsalgia, left foot M79.672,G89.29 (ICD-10-CM) - Chronic foot pain, left   Evaluation Date: 11/4/2019  Authorization Period Expiration: 12/31/2019   Plan of Care Expiration: 1/4/20  Visit # / Visits authorized: 6/ 15    Time In: 9:00am  Time Out: 9: 50 am  Total Billable Time: 45 minutes    Precautions: Standard    Subjective     Pt reports: her foot has been doing good, except last week she did have some pain due to prolonged walking in cold weather.  She was compliant with home exercise program.  Response to previous treatment: decreased foot pain  Functional change: improving tolerance to running    Pain: 1/10  Location: left feet      Objective     Mary Carmen received therapeutic exercises to develop strength, endurance, ROM and flexibility for 45 minutes including:    Date  12/30/19 12/9/19 12/2/19 11/25/19 11/18/19   VISIT 6/15 5/15 4/15 3/15 2/15           POC EXP. DATE 1/4/20 1/4/20 1/4/20 1/4/20 1/4/20   FACE-TO-FACE 1/9/20 1/9/20 12/4/19 12/4/19 12/4/19   FOTO 5/5 4/5 3/5 3/5 2/5           TABLE:        gastroc stretch 10 x 10" 10 x 10" 10 x 10" 10 x 10" 10 x 10"   4 way ankle  3 x 10 BTB 3 x 10 BTB 3 x 10 BTB 3 x 10 BTB 3 x 10 BTB   [place]  2 cups 2 cups 2 cups 2 cups 1 cup           STANDING:        Calf raises 3 x 10 3 x 10 3 x 10 3 x 10 3 x 10   Single leg stance 3 x 10 ball toss on Airex 3 x 10 ball toss on Airex 3 x 10 ball toss on Airex 3 x 10 ball toss on Airex 3 x 30" Airex   BAPS board (DF/PF, Pro/Sup, CW/CCW) L3 2 x 10 ea L2 1 x 10 ea L2 1 x 10 ea --- L2 2 x 10 ea   HOANGU ball:  Step ups  Squats  Lateral step over   3 x 10   3 x 10  3 x 10   3 x 10   3 x 10  3 x " 10   3 x 10  3 x 10  3 x 10   3 x 10  3 x 10  3 x 10   3 x 10  2 x 10  2 x 10   Single leg stance cone  X 4 X 4 X 4 X 4 X 3   Toe walks --- -- -- -- 4 x 14 ft   Heel walks --- -- -- -- 4 x 14 ft   Ladder drills 2 laps 2 laps ea 2 laps ea 2 laps ea                                    Initials SHALINI LOYA MA, MA, MA     Functional limitation reporting disability percentage was obtained through use of FOTO Foot Survey indicating 1% disability     Home Exercises Provided and Patient Education Provided     Education provided:   - continue with HEP  - gradually progress jogging distance and intensity   - hold plyometric exercises at gym at this time    Written Home Exercises Provided: Patient instructed to cont prior HEP.  Exercises were reviewed and Mary Carmen was able to demonstrate them prior to the end of the session.  Mary Carmen demonstrated good  understanding of the education provided.     See EMR under Patient Instructions for exercises provided prior visit.    Assessment     Mary Carmen is noted to have improved tolerance to proprioceptive training, BOSU ball exercises, and significant improvement in FOTO status.  She has some discomfort during single leg hop ladder drill but no complaints during all other exercises.  Pt will continue with current plan of care.  Mary Carmen is progressing well towards her goals.   Pt prognosis is Good.     Pt will continue to benefit from skilled outpatient physical therapy to address the deficits listed in the problem list box on initial evaluation, provide pt/family education and to maximize pt's level of independence in the home and community environment.     Pt's spiritual, cultural and educational needs considered and pt agreeable to plan of care and goals.     Anticipated barriers to physical therapy: none    Goals:   Short Term Goals: (4 weeks)  1. Patient will be compliant with HEP to promote the independent management of current diagnosis. MET  2. Patient will increase left ankle dorsiflexion  strength to 5/5. In Progress  Patient will report a decrease in complaints of left foot pain to 3/10 while jogging. In Progress   Long Term Goals: (8 weeks)  1. Patient will increase strength of left ankle to 5/5 to improve stability while running and performing normal exercise routine. In Progress  2. Patient will report a decrease in complaints of left foot pain to 1/10 while jogging. In Progress  3.   Patient will improve FOTO limitation status from 38% to 24% placing the patient in the 20-40%% impaired, limited, or restricted category indicating increased functional mobility. MET    Plan     Continue with PT POC.    Leon Caballero, PT

## 2020-01-07 ENCOUNTER — OFFICE VISIT (OUTPATIENT)
Dept: URGENT CARE | Facility: CLINIC | Age: 35
End: 2020-01-07
Payer: COMMERCIAL

## 2020-01-07 ENCOUNTER — NURSE TRIAGE (OUTPATIENT)
Dept: ADMINISTRATIVE | Facility: CLINIC | Age: 35
End: 2020-01-07

## 2020-01-07 ENCOUNTER — HOSPITAL ENCOUNTER (EMERGENCY)
Facility: OTHER | Age: 35
Discharge: HOME | End: 2020-01-07
Attending: EMERGENCY MEDICINE
Payer: COMMERCIAL

## 2020-01-07 VITALS
OXYGEN SATURATION: 99 % | WEIGHT: 150 LBS | TEMPERATURE: 99 F | HEART RATE: 74 BPM | DIASTOLIC BLOOD PRESSURE: 66 MMHG | HEIGHT: 65 IN | SYSTOLIC BLOOD PRESSURE: 106 MMHG | BODY MASS INDEX: 24.99 KG/M2

## 2020-01-07 VITALS
DIASTOLIC BLOOD PRESSURE: 65 MMHG | TEMPERATURE: 98 F | HEART RATE: 65 BPM | SYSTOLIC BLOOD PRESSURE: 111 MMHG | RESPIRATION RATE: 18 BRPM | OXYGEN SATURATION: 100 %

## 2020-01-07 DIAGNOSIS — R07.9 CHEST PAIN, UNSPECIFIED TYPE: Primary | ICD-10-CM

## 2020-01-07 DIAGNOSIS — B34.9 VIRAL SYNDROME: Primary | ICD-10-CM

## 2020-01-07 DIAGNOSIS — R07.9 CHEST PAIN: ICD-10-CM

## 2020-01-07 DIAGNOSIS — R07.89 ATYPICAL CHEST PAIN: ICD-10-CM

## 2020-01-07 LAB
ALBUMIN SERPL BCP-MCNC: 4.2 G/DL (ref 3.5–5.2)
ALP SERPL-CCNC: 61 U/L (ref 55–135)
ALT SERPL W/O P-5'-P-CCNC: 10 U/L (ref 10–44)
ANION GAP SERPL CALC-SCNC: 11 MMOL/L (ref 8–16)
AST SERPL-CCNC: 18 U/L (ref 10–40)
B-HCG UR QL: NEGATIVE
BASOPHILS # BLD AUTO: 0.02 K/UL (ref 0–0.2)
BASOPHILS NFR BLD: 0.2 % (ref 0–1.9)
BILIRUB SERPL-MCNC: 0.1 MG/DL (ref 0.1–1)
BUN SERPL-MCNC: 13 MG/DL (ref 6–20)
CALCIUM SERPL-MCNC: 8.7 MG/DL (ref 8.7–10.5)
CHLORIDE SERPL-SCNC: 108 MMOL/L (ref 95–110)
CO2 SERPL-SCNC: 23 MMOL/L (ref 23–29)
CREAT SERPL-MCNC: 1.1 MG/DL (ref 0.5–1.4)
CTP QC/QA: YES
CTP QC/QA: YES
DIFFERENTIAL METHOD: ABNORMAL
EOSINOPHIL # BLD AUTO: 0 K/UL (ref 0–0.5)
EOSINOPHIL NFR BLD: 0.2 % (ref 0–8)
ERYTHROCYTE [DISTWIDTH] IN BLOOD BY AUTOMATED COUNT: 12.8 % (ref 11.5–14.5)
EST. GFR  (AFRICAN AMERICAN): >60 ML/MIN/1.73 M^2
EST. GFR  (NON AFRICAN AMERICAN): >60 ML/MIN/1.73 M^2
FLUAV AG NPH QL: NEGATIVE
FLUBV AG NPH QL: NEGATIVE
GLUCOSE SERPL-MCNC: 89 MG/DL (ref 70–110)
HCT VFR BLD AUTO: 39 % (ref 37–48.5)
HGB BLD-MCNC: 12.4 G/DL (ref 12–16)
IMM GRANULOCYTES # BLD AUTO: 0.03 K/UL (ref 0–0.04)
IMM GRANULOCYTES NFR BLD AUTO: 0.4 % (ref 0–0.5)
LYMPHOCYTES # BLD AUTO: 1.9 K/UL (ref 1–4.8)
LYMPHOCYTES NFR BLD: 23.2 % (ref 18–48)
MCH RBC QN AUTO: 31.2 PG (ref 27–31)
MCHC RBC AUTO-ENTMCNC: 31.8 G/DL (ref 32–36)
MCV RBC AUTO: 98 FL (ref 82–98)
MONOCYTES # BLD AUTO: 0.5 K/UL (ref 0.3–1)
MONOCYTES NFR BLD: 6.1 % (ref 4–15)
NEUTROPHILS # BLD AUTO: 5.8 K/UL (ref 1.8–7.7)
NEUTROPHILS NFR BLD: 69.9 % (ref 38–73)
NRBC BLD-RTO: 0 /100 WBC
PLATELET # BLD AUTO: 298 K/UL (ref 150–350)
PMV BLD AUTO: 10.4 FL (ref 9.2–12.9)
POTASSIUM SERPL-SCNC: 4 MMOL/L (ref 3.5–5.1)
PROT SERPL-MCNC: 7.5 G/DL (ref 6–8.4)
RBC # BLD AUTO: 3.98 M/UL (ref 4–5.4)
SODIUM SERPL-SCNC: 142 MMOL/L (ref 136–145)
TROPONIN I SERPL DL<=0.01 NG/ML-MCNC: <0.006 NG/ML (ref 0–0.03)
WBC # BLD AUTO: 8.23 K/UL (ref 3.9–12.7)

## 2020-01-07 PROCEDURE — 85025 COMPLETE CBC W/AUTO DIFF WBC: CPT

## 2020-01-07 PROCEDURE — 96360 HYDRATION IV INFUSION INIT: CPT

## 2020-01-07 PROCEDURE — 93010 ELECTROCARDIOGRAM REPORT: CPT | Mod: S$GLB,,, | Performed by: INTERNAL MEDICINE

## 2020-01-07 PROCEDURE — 93010 ELECTROCARDIOGRAM REPORT: CPT | Mod: ,,, | Performed by: INTERNAL MEDICINE

## 2020-01-07 PROCEDURE — 93010 EKG 12-LEAD: ICD-10-PCS | Mod: S$GLB,,, | Performed by: INTERNAL MEDICINE

## 2020-01-07 PROCEDURE — 99285 EMERGENCY DEPT VISIT HI MDM: CPT | Mod: 25

## 2020-01-07 PROCEDURE — 81025 URINE PREGNANCY TEST: CPT | Performed by: EMERGENCY MEDICINE

## 2020-01-07 PROCEDURE — 63600175 PHARM REV CODE 636 W HCPCS: Performed by: EMERGENCY MEDICINE

## 2020-01-07 PROCEDURE — 93005 ELECTROCARDIOGRAM TRACING: CPT

## 2020-01-07 PROCEDURE — 99214 PR OFFICE/OUTPT VISIT, EST, LEVL IV, 30-39 MIN: ICD-10-PCS | Mod: 25,S$GLB,, | Performed by: FAMILY MEDICINE

## 2020-01-07 PROCEDURE — 93005 EKG 12-LEAD: ICD-10-PCS | Mod: S$GLB,,, | Performed by: FAMILY MEDICINE

## 2020-01-07 PROCEDURE — 84484 ASSAY OF TROPONIN QUANT: CPT

## 2020-01-07 PROCEDURE — 93010 EKG 12-LEAD: ICD-10-PCS | Mod: ,,, | Performed by: INTERNAL MEDICINE

## 2020-01-07 PROCEDURE — 80053 COMPREHEN METABOLIC PANEL: CPT

## 2020-01-07 PROCEDURE — 93005 ELECTROCARDIOGRAM TRACING: CPT | Mod: S$GLB,,, | Performed by: FAMILY MEDICINE

## 2020-01-07 PROCEDURE — 87804 POCT INFLUENZA A/B: ICD-10-PCS | Mod: QW,S$GLB,, | Performed by: FAMILY MEDICINE

## 2020-01-07 PROCEDURE — 99214 OFFICE O/P EST MOD 30 MIN: CPT | Mod: 25,S$GLB,, | Performed by: FAMILY MEDICINE

## 2020-01-07 PROCEDURE — 87804 INFLUENZA ASSAY W/OPTIC: CPT | Mod: 59,QW,S$GLB, | Performed by: FAMILY MEDICINE

## 2020-01-07 RX ORDER — NAPROXEN SODIUM 220 MG/1
162 TABLET, FILM COATED ORAL ONCE
Status: COMPLETED | OUTPATIENT
Start: 2020-01-07 | End: 2020-01-07

## 2020-01-07 RX ADMIN — SODIUM CHLORIDE 1000 ML: 0.9 INJECTION, SOLUTION INTRAVENOUS at 05:01

## 2020-01-07 RX ADMIN — NAPROXEN SODIUM 162 MG: 220 TABLET, FILM COATED ORAL at 03:01

## 2020-01-07 NOTE — PROGRESS NOTES
"Subjective:       Patient ID: Mary Carmen Ann is a 34 y.o. female.    Vitals:  height is 5' 5" (1.651 m) and weight is 68 kg (150 lb). Her temperature is 99.3 °F (37.4 °C). Her blood pressure is 106/66 and her pulse is 74. Her oxygen saturation is 99%.     Chief Complaint: Chest Pain    Pt stated that she started to experience chest tightness and pressure in her chest yesterday and since then has been getting off and on chest discomfort.  Pain intensity is usually 1 or 2/10, located into the center of chest, last about 1 or 2 min at a time, associated with mild shortness of breath.  Also complained of fatigue and generalized weakness.  She stated that the pressure radiates to her mid back. She stated that its more discomfort not pain. Denies fever, chills, cough, abdominal pain, headache, dysuria.  Denies history of heart disease.  Denies smoking.  Denies strong family history of heart disease.    Chest Pain    This is a new problem. The current episode started yesterday. The onset quality is sudden. The problem occurs constantly. The problem has been unchanged. The pain is present in the substernal region (pressure ). The pain is at a severity of 0/10. The pain is mild (discomfort ). The quality of the pain is described as tightness. The pain radiates to the mid back. Associated symptoms include shortness of breath. Pertinent negatives include no abdominal pain, back pain, fever, nausea, palpitations, syncope or vomiting. The pain is aggravated by nothing. She has tried nothing for the symptoms. The treatment provided no relief. There are no known risk factors.       Constitution: Positive for generalized weakness. Negative for chills, fatigue, fever and international travel in last 60 days.   HENT: Negative for trouble swallowing.    Neck: Negative for neck pain.   Cardiovascular: Positive for chest pain. Negative for chest trauma, leg swelling, palpitations and passing out.        Chest pressure    Respiratory: " Positive for shortness of breath. Negative for sleep apnea.    Gastrointestinal: Negative for abdominal pain, nausea, vomiting and heartburn.   Genitourinary: Negative for history of kidney stones.   Musculoskeletal: Negative for back pain.   Skin: Negative for rash.   Neurological: Positive for tremors. Negative for light-headedness and passing out.   Hematologic/Lymphatic: Negative for history of blood clots.   Psychiatric/Behavioral: Negative for nervous/anxious. The patient is not nervous/anxious.        Objective:      Physical Exam   Constitutional: She is oriented to person, place, and time. She appears well-developed and well-nourished. She is cooperative.  Non-toxic appearance. She does not appear ill. No distress.   HENT:   Head: Normocephalic and atraumatic.   Right Ear: Hearing, tympanic membrane, external ear and ear canal normal.   Left Ear: Hearing, tympanic membrane, external ear and ear canal normal.   Nose: Nose normal. No mucosal edema, rhinorrhea or nasal deformity. No epistaxis. Right sinus exhibits no maxillary sinus tenderness and no frontal sinus tenderness. Left sinus exhibits no maxillary sinus tenderness and no frontal sinus tenderness.   Mouth/Throat: Uvula is midline, oropharynx is clear and moist and mucous membranes are normal. No trismus in the jaw. Normal dentition. No uvula swelling. No posterior oropharyngeal erythema.   Eyes: Conjunctivae and lids are normal. Right eye exhibits no discharge. Left eye exhibits no discharge. No scleral icterus.   Neck: Trachea normal, normal range of motion, full passive range of motion without pain and phonation normal. Neck supple.   Cardiovascular: Normal rate, regular rhythm, normal heart sounds, intact distal pulses and normal pulses. Exam reveals no gallop and no friction rub.   No murmur heard.  Pulmonary/Chest: Effort normal and breath sounds normal. No stridor. No respiratory distress. She has no wheezes. She has no rales. She exhibits no  tenderness.   Abdominal: Soft. Normal appearance and bowel sounds are normal. She exhibits no distension, no pulsatile midline mass and no mass. There is no tenderness.   Musculoskeletal: Normal range of motion. She exhibits no edema, tenderness or deformity.   Neurological: She is alert and oriented to person, place, and time. She exhibits normal muscle tone. Coordination normal.   Skin: Skin is warm, dry, intact, not diaphoretic and not pale.   Psychiatric: She has a normal mood and affect. Her speech is normal and behavior is normal. Judgment and thought content normal. Cognition and memory are normal.   Nursing note and vitals reviewed.        Assessment:       1. Chest pain, unspecified type        Plan:         Chest pain, unspecified type  -     IN OFFICE EKG 12-LEAD (to [facility])  -     POCT Influenza A/B  -     aspirin chewable tablet 162 mg        PLEASE READ YOUR DISCHARGE INSTRUCTIONS ENTIRELY AS IT CONTAINS IMPORTANT INFORMATION.    Please return here or go to the Emergency Department for any concerns or worsening of condition.    If you were prescribed antibiotics, please take them to completion.      If not allergic, please take over the counter Tylenol (Acetaminophen) and/or Motrin (Ibuprofen) as directed for control of pain and/or fever.  Please follow up with your primary care doctor or specialist as needed.  Soak wound as discussed and apply warm compresses frequently      If you smoke, please stop smoking.    Please return or see your primary care doctor if you develop new or worsening symptoms.     Please arrange follow up with your primary medical clinic as soon as possible. You must understand that you've received an Urgent Care treatment only and that you may be released before all of your medical problems are known or treated. You, the patient, will arrange for follow up as instructed. If your symptoms worsen or fail to improve you should go to the Emergency Room.  Uncertain Causes of Chest  Pain    Chest pain can happen for a number of reasons. Sometimes the cause can't be determined. If your condition does not seem serious, and your pain does not appear to be coming from your heart, your healthcare provider may recommend watching it closely. Sometimes the signs of a serious problem take more time to appear. Many problems not related to your heart can cause chest pain.These include:  · Musculoskeletal. Costochondritis, an inflammation of the tissues around the ribs that can occur from trauma or overuse injuries  · Respiratory. Pneumonia, pneumothorax, or pneumonitis (inflammation of the lining of the chest and lungs)  · Gastrointestinal. Esophageal reflux, heartburn, or gallbladder disease  · Anxiety and panic disorders  · Nerve compression and neuritis  · Miscellaneous problems such as aortic aneurysm or pulmonary embolism (a blood clot in the lungs)  Home care  After your visit, follow these recommendations:  · Rest today and avoid strenuous activity.  · Take any prescribed medicine as directed.  · Be aware of any recurrent chest pain and notice any changes  Follow-up care  Follow up with your healthcare provider if you do not start to feel better within 24 hours, or as advised.  Call 911  Call 911 if any of these occur:  · A change in the type of pain: if it feels different, becomes more severe, lasts longer, or begins to spread into your shoulder, arm, neck, jaw or back  · Shortness of breath or increased pain with breathing  · Weakness, dizziness, or fainting  · Rapid heart beat  · Crushing sensation in your chest  When to seek medical advice  Call your healthcare provider right away if any of the following occur:  · Cough with dark colored sputum (phlegm) or blood  · Fever of 100.4ºF (38ºC) or higher, or as directed by your healthcare provider  · Swelling, pain or redness in one leg  · Shortness of breath  Date Last Reviewed: 12/30/2015  © 9533-6685 [employer]. [address] [place] [address]  Road, [place] [address]. All rights reserved. This information is not intended as a substitute for professional medical care. Always follow your healthcare professional's instructions.      Pt is referred to ER for further evaluation and treatment.   Called to [facility] and talk to triage nurse CR and informed about the transfer.

## 2020-01-07 NOTE — TELEPHONE ENCOUNTER
Rates severity 1/10, intermittent, centrally located,  Feels slightly like pressure, but is fleeting in nature.  She had could symptoms with severe cough over the weekend, cough symptoms has resolved.  Attempted to get her an appt with her pcp's office, unable to reach them, [place] [facility] [place] [facility]    Reason for Disposition   All other patients with chest pain    Additional Information   Negative: Severe difficulty breathing (e.g., struggling for each breath, speaks in single words)   Negative: Passed out (i.e., fainted, collapsed and was not responding)   Negative: Chest pain lasting longer than 5 minutes and ANY of the following:* Over 50 years old* Over 30 years old and at least one cardiac risk factor (i.e., high blood pressure, diabetes, high cholesterol, obesity, smoker or strong family history of heart disease)* Pain is crushing, pressure-like, or heavy * Took nitroglycerin and chest pain was not relieved* History of heart disease (i.e., angina, heart attack, bypass surgery, angioplasty, CHF)   Negative: Visible sweat on face or sweat dripping down face   Negative: Sounds like a life-threatening emergency to the triager   Negative: Followed an injury to chest   Negative: SEVERE chest pain   Negative: Pain also present in shoulder(s) or arm(s) or jaw   Negative: Difficulty breathing   Negative: Cocaine use within last 3 days   Negative: History of prior 'blood clot' in leg or lungs (i.e., deep vein thrombosis, pulmonary embolism)   Negative: Recent illness requiring prolonged bed rest (i.e., immobilization)   Negative: Hip or leg fracture in past 2 months (e.g, or had cast on leg or ankle)   Negative: Major surgery in the past month   Negative: Recent long-distance travel with prolonged time in car, bus, plane, or train (i.e., within past 2 weeks; 6 or more hours duration)   Negative: Heart beating irregularly or very rapidly   Negative: Chest pain lasting longer than  5 minutes   Negative: Intermittent chest pain and pain has been increasing in severity or frequency   Negative: Dizziness or lightheadedness   Negative: Coughing up blood   Negative: Patient sounds very sick or weak to the triager   Negative: Fever > 100.5 F (38.1 C)   Negative: Intermittent chest pains persist > 3 days    Protocols used: CHEST PAIN-A-[place]

## 2020-01-07 NOTE — PATIENT INSTRUCTIONS
PLEASE READ YOUR DISCHARGE INSTRUCTIONS ENTIRELY AS IT CONTAINS IMPORTANT INFORMATION.    Please return here or go to the Emergency Department for any concerns or worsening of condition.    If you were prescribed antibiotics, please take them to completion.      If not allergic, please take over the counter Tylenol (Acetaminophen) and/or Motrin (Ibuprofen) as directed for control of pain and/or fever.  Please follow up with your primary care doctor or specialist as needed.  Soak wound as discussed and apply warm compresses frequently      If you smoke, please stop smoking.    Please return or see your primary care doctor if you develop new or worsening symptoms.     Please arrange follow up with your primary medical clinic as soon as possible. You must understand that you've received an Urgent Care treatment only and that you may be released before all of your medical problems are known or treated. You, the patient, will arrange for follow up as instructed. If your symptoms worsen or fail to improve you should go to the Emergency Room.  Uncertain Causes of Chest Pain    Chest pain can happen for a number of reasons. Sometimes the cause can't be determined. If your condition does not seem serious, and your pain does not appear to be coming from your heart, your healthcare provider may recommend watching it closely. Sometimes the signs of a serious problem take more time to appear. Many problems not related to your heart can cause chest pain.These include:  · Musculoskeletal. Costochondritis, an inflammation of the tissues around the ribs that can occur from trauma or overuse injuries  · Respiratory. Pneumonia, pneumothorax, or pneumonitis (inflammation of the lining of the chest and lungs)  · Gastrointestinal. Esophageal reflux, heartburn, or gallbladder disease  · Anxiety and panic disorders  · Nerve compression and neuritis  · Miscellaneous problems such as aortic aneurysm or pulmonary embolism (a blood clot in the  lungs)  Home care  After your visit, follow these recommendations:  · Rest today and avoid strenuous activity.  · Take any prescribed medicine as directed.  · Be aware of any recurrent chest pain and notice any changes  Follow-up care  Follow up with your healthcare provider if you do not start to feel better within 24 hours, or as advised.  Call 911  Call 911 if any of these occur:  · A change in the type of pain: if it feels different, becomes more severe, lasts longer, or begins to spread into your shoulder, arm, neck, jaw or back  · Shortness of breath or increased pain with breathing  · Weakness, dizziness, or fainting  · Rapid heart beat  · Crushing sensation in your chest  When to seek medical advice  Call your healthcare provider right away if any of the following occur:  · Cough with dark colored sputum (phlegm) or blood  · Fever of 100.4ºF (38ºC) or higher, or as directed by your healthcare provider  · Swelling, pain or redness in one leg  · Shortness of breath  Date Last Reviewed: 12/30/2015  © 0481-3653 [employer]. [address] [place] [address], [place] [address]. All rights reserved. This information is not intended as a substitute for professional medical care. Always follow your healthcare professional's instructions.

## 2020-01-07 NOTE — ED PROVIDER NOTES
Encounter Date: 1/7/2020    SCRIBE #1 NOTE: I, Jack Ford, am scribing for, and in the presence of, Dr. Allen.       History   No chief complaint on file.    Time seen by provider: 4:12 PM    This is a 34 y.o. female who presents with complaint of intermittent chest tightness that began yesterday evening. She reports she felt a pounding in her chest prior to the chest tightness. She denies having this chest tightness in the past. The patient reports associated SOB on exertion with everyday activities. The patient reports she began having a cough with congestion and nausea about four days ago. She denies her chest tightness worsening with her cough. She denies vomiting, diarrhea, or leg pain. The patient reports being seen at urgent care today where she was given 2 aspirin.   provider was concerned about ECG findings so referred patient for higher acuity evaluation.  She denies current chest tightness or other pain. The patient denies recent long travel and states her last plane ride was four hours. She reports family hx of heart disease in her mother. She denies sick contact. She denies medical hx of HTN or DM.    The history is provided by the patient.     Review of patient's allergies indicates:  No Known Allergies  No past medical history on file.  Past Surgical History:   Procedure Laterality Date    KNEE SURGERY       Family History   Problem Relation Age of Onset    Diabetes Mother     Hypertension Mother     Cancer Father      Social History     Tobacco Use    Smoking status: Never Smoker    Smokeless tobacco: Never Used   Substance Use Topics    Alcohol use: Yes     Frequency: 2-4 times a month     Drinks per session: 1 or 2     Binge frequency: Never     Comment: occassionally     Drug use: No     Review of Systems   Constitutional: Negative for fever.   HENT: Positive for congestion.    Respiratory: Positive for cough, chest tightness and shortness of breath.    Cardiovascular: Negative for  chest pain.   Gastrointestinal: Positive for nausea. Negative for diarrhea and vomiting.   Genitourinary: Negative for dysuria.   Musculoskeletal: Negative for back pain.        - Leg pain.   Skin: Negative for rash.   Neurological: Negative for weakness.   Hematological: Does not bruise/bleed easily.   All other systems reviewed and are negative.      Physical Exam     Initial Vitals   BP Pulse Resp Temp SpO2   01/07/20 1615 01/07/20 1615 01/07/20 1616 01/07/20 1616 01/07/20 1615   (!) 140/65 74 20 98.7 °F (37.1 °C) 100 %      MAP       --                Physical Exam    Nursing note and vitals reviewed.  Constitutional: She appears well-developed and well-nourished. She is not diaphoretic. No distress.   HENT:   Head: Normocephalic and atraumatic.   Eyes: Conjunctivae and EOM are normal. No scleral icterus.   Neck: Normal range of motion. Neck supple.   Cardiovascular: Normal rate, regular rhythm and normal heart sounds. Exam reveals no gallop and no friction rub.    No murmur heard.  Pulmonary/Chest: Breath sounds normal. No respiratory distress. She has no wheezes. She has no rhonchi. She has no rales.   Musculoskeletal: Normal range of motion. She exhibits no edema or tenderness.   Lymphadenopathy:     She has no cervical adenopathy.   Neurological: She is alert and oriented to person, place, and time.   Skin: Skin is warm and dry. No rash noted. No erythema. No pallor.         ED Course   Procedures  Labs Reviewed   CBC W/ AUTO DIFFERENTIAL - Abnormal; Notable for the following components:       Result Value    RBC 3.98 (*)     Mean Corpuscular Hemoglobin 31.2 (*)     Mean Corpuscular Hemoglobin Conc 31.8 (*)     All other components within normal limits   COMPREHENSIVE METABOLIC PANEL   TROPONIN I   POCT URINE PREGNANCY     EKG Readings: (Independently Interpreted)   Normal sinus rhythm at a rate of 75 bpm. Non-specific ST abnormalities. No ST elevation. Otherwise normal.       Imaging Results          X-Ray  Chest AP Portable (Final result)  Result time 01/07/20 16:55:52    Final result by Natanael Cuevas MD (01/07/20 16:55:52)                 Impression:      No acute intrathoracic process identified.      Electronically signed by: Natanael Cuevas MD  Date:    01/07/2020  Time:    16:55             Narrative:    EXAMINATION:  XR CHEST AP PORTABLE    CLINICAL HISTORY:  Chest Pain;    TECHNIQUE:  Single frontal view of the chest was performed.    COMPARISON:  None    FINDINGS:  Cardiac silhouette is normal in size.  Lungs are symmetrically expanded.  No evidence of focal consolidative process, pneumothorax, or significant effusion.  No acute osseous abnormality identified.                              X-Rays:   Independently Interpreted Readings:   Chest X-Ray: Normal heart size.  No infiltrates.  No acute abnormalities.     Medical Decision Making:   History:   Old Medical Records: I decided to obtain old medical records.  Independently Interpreted Test(s):   I have ordered and independently interpreted X-rays - see prior notes.  I have ordered and independently interpreted EKG Reading(s) - see prior notes  Clinical Tests:   Lab Tests: Ordered and Reviewed  Radiological Study: Ordered and Reviewed  Medical Tests: Ordered and Reviewed  ED Management:  6:12 PM - Patient still feels well and has not had symptoms since she's been here. Patient is ready to go home.            Scribe Attestation:   Scribe #1: I performed the above scribed service and the documentation accurately describes the services I performed. I attest to the accuracy of the note.    Attending Attestation:           Physician Attestation for Scribe:  Physician Attestation Statement for Scribe #1: I, Dr. Allen, reviewed documentation, as scribed by Jack Ford in my presence, and it is both accurate and complete.                               Clinical Impression:     1. Viral syndrome    2. Chest pain    3. Atypical chest pain                                 Sofie Allen MD  01/09/20 [id number]

## 2020-01-07 NOTE — ED NOTES
"Pt reports intermittent chest tightness that started yesterday with weakness. Started yesterday while sitting in the office.Reports feeling pounding in chest followed by tightness. Cough and congestion this weekend. Denies current chest discomfort. "I feel fine." Placed on cont cardiac, bp,a nd spo2 monitors. Received 2 ASA at urgent care PTA.   "

## 2020-01-13 ENCOUNTER — CLINICAL SUPPORT (OUTPATIENT)
Dept: REHABILITATION | Facility: HOSPITAL | Age: 35
End: 2020-01-13
Attending: PODIATRIST
Payer: COMMERCIAL

## 2020-01-13 DIAGNOSIS — M79.672 PAIN IN LEFT FOOT: ICD-10-CM

## 2020-01-13 PROCEDURE — 97110 THERAPEUTIC EXERCISES: CPT | Mod: PO

## 2020-01-13 NOTE — PLAN OF CARE
Outpatient Therapy Discharge Summary     Name: Mary Carmen Ann  Clinic Number: [id number]    Therapy Diagnosis:   Encounter Diagnosis   Name Primary?    Pain in left foot      Physician: Carlos Ji DPM    Physician Orders: PT Eval and Treat   Medical Diagnosis: M77.42 (ICD-10-CM) - Metatarsalgia, left foot M79.672,G89.29 (ICD-10-CM) - Chronic foot pain, left   Evaluation Date: 11/4/2019      Date of Last visit: 1/13/20  Total Visits Received: 7  Cancelled Visits: 0  No Show Visits: 0    Assessment    Goals:   Short Term Goals: (4 weeks)  1. Patient will be compliant with HEP to promote the independent management of current diagnosis. MET  2. Patient will increase left ankle dorsiflexion strength to 5/5. MET  Patient will report a decrease in complaints of left foot pain to 3/10 while jogging. MET   Long Term Goals: (8 weeks)  1. Patient will increase strength of left ankle to 5/5 to improve stability while running and performing normal exercise routine. MET  2. Patient will report a decrease in complaints of left foot pain to 1/10 while jogging. MET  3.   Patient will improve FOTO limitation status from 38% to 24% placing the patient in the 20-40%% impaired, limited, or restricted category indicating increased functional mobility. MET    Discharge reason: Patient has met all of his/her goals    Plan   This patient is discharged from Physical Therapy

## 2020-10-05 ENCOUNTER — PATIENT MESSAGE (OUTPATIENT)
Dept: INTERNAL MEDICINE | Facility: CLINIC | Age: 35
End: 2020-10-05